# Patient Record
Sex: FEMALE | Employment: FULL TIME | URBAN - METROPOLITAN AREA
[De-identification: names, ages, dates, MRNs, and addresses within clinical notes are randomized per-mention and may not be internally consistent; named-entity substitution may affect disease eponyms.]

---
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
## 2021-02-20 ENCOUNTER — HOSPITAL ENCOUNTER (EMERGENCY)
Age: 21
Discharge: HOME | End: 2021-02-20
Attending: EMERGENCY MEDICINE
Payer: COMMERCIAL

## 2021-02-20 VITALS
RESPIRATION RATE: 14 BRPM | SYSTOLIC BLOOD PRESSURE: 155 MMHG | DIASTOLIC BLOOD PRESSURE: 71 MMHG | TEMPERATURE: 98 F | OXYGEN SATURATION: 98 % | HEART RATE: 104 BPM

## 2021-02-20 DIAGNOSIS — N39.0 URINARY TRACT INFECTION IN FEMALE: ICD-10-CM

## 2021-02-20 DIAGNOSIS — R10.32 ABDOMINAL PAIN, LEFT LOWER QUADRANT: Primary | ICD-10-CM

## 2021-02-20 LAB
-: ABNORMAL
ABSOLUTE EOS #: 0.1 K/UL (ref 0–0.4)
ABSOLUTE IMMATURE GRANULOCYTE: NORMAL K/UL (ref 0–0.3)
ABSOLUTE LYMPH #: 2.1 K/UL (ref 1.2–5.2)
ABSOLUTE MONO #: 0.5 K/UL (ref 0.1–1.3)
ALBUMIN SERPL-MCNC: 4.5 G/DL (ref 3.5–5.2)
ALBUMIN/GLOBULIN RATIO: ABNORMAL (ref 1–2.5)
ALP BLD-CCNC: 84 U/L (ref 35–104)
ALT SERPL-CCNC: 15 U/L (ref 5–33)
AMORPHOUS: ABNORMAL
ANION GAP SERPL CALCULATED.3IONS-SCNC: 7 MMOL/L (ref 9–17)
AST SERPL-CCNC: 29 U/L
BACTERIA: ABNORMAL
BASOPHILS # BLD: 1 % (ref 0–2)
BASOPHILS ABSOLUTE: 0.1 K/UL (ref 0–0.2)
BILIRUB SERPL-MCNC: 1.51 MG/DL (ref 0.3–1.2)
BILIRUBIN URINE: ABNORMAL
BUN BLDV-MCNC: 12 MG/DL (ref 6–20)
BUN/CREAT BLD: ABNORMAL (ref 9–20)
CALCIUM SERPL-MCNC: 9.3 MG/DL (ref 8.6–10.4)
CASTS UA: ABNORMAL /LPF
CHLORIDE BLD-SCNC: 102 MMOL/L (ref 98–107)
CO2: 25 MMOL/L (ref 20–31)
COLOR: YELLOW
COMMENT UA: ABNORMAL
CREAT SERPL-MCNC: 0.46 MG/DL (ref 0.5–0.9)
CRYSTALS, UA: ABNORMAL /HPF
DIFFERENTIAL TYPE: NORMAL
EOSINOPHILS RELATIVE PERCENT: 2 % (ref 0–4)
EPITHELIAL CELLS UA: ABNORMAL /HPF
GFR AFRICAN AMERICAN: ABNORMAL ML/MIN
GFR NON-AFRICAN AMERICAN: ABNORMAL ML/MIN
GFR SERPL CREATININE-BSD FRML MDRD: ABNORMAL ML/MIN/{1.73_M2}
GFR SERPL CREATININE-BSD FRML MDRD: ABNORMAL ML/MIN/{1.73_M2}
GLUCOSE BLD-MCNC: 100 MG/DL (ref 70–99)
GLUCOSE URINE: NEGATIVE
HCG(URINE) PREGNANCY TEST: NEGATIVE
HCT VFR BLD CALC: 44.9 % (ref 36–46)
HEMOGLOBIN: 15.3 G/DL (ref 12–16)
IMMATURE GRANULOCYTES: NORMAL %
KETONES, URINE: ABNORMAL
LEUKOCYTE ESTERASE, URINE: ABNORMAL
LYMPHOCYTES # BLD: 32 % (ref 25–45)
MCH RBC QN AUTO: 31 PG (ref 26–34)
MCHC RBC AUTO-ENTMCNC: 34.1 G/DL (ref 31–37)
MCV RBC AUTO: 91 FL (ref 80–100)
MONOCYTES # BLD: 7 % (ref 2–8)
MUCUS: ABNORMAL
NITRITE, URINE: NEGATIVE
NRBC AUTOMATED: NORMAL PER 100 WBC
OTHER OBSERVATIONS UA: ABNORMAL
PDW BLD-RTO: 13 % (ref 11.5–14.9)
PH UA: 5.5 (ref 5–8)
PLATELET # BLD: 232 K/UL (ref 150–450)
PLATELET ESTIMATE: NORMAL
PMV BLD AUTO: 9.2 FL (ref 6–12)
POTASSIUM SERPL-SCNC: 4.9 MMOL/L (ref 3.7–5.3)
PROTEIN UA: NEGATIVE
RBC # BLD: 4.93 M/UL (ref 4–5.2)
RBC # BLD: NORMAL 10*6/UL
RBC UA: ABNORMAL /HPF
RENAL EPITHELIAL, UA: ABNORMAL /HPF
SEG NEUTROPHILS: 58 % (ref 34–64)
SEGMENTED NEUTROPHILS ABSOLUTE COUNT: 3.9 K/UL (ref 1.3–9.1)
SODIUM BLD-SCNC: 134 MMOL/L (ref 135–144)
SPECIFIC GRAVITY UA: 1.03 (ref 1–1.03)
TOTAL PROTEIN: 7.6 G/DL (ref 6.4–8.3)
TRICHOMONAS: ABNORMAL
TURBIDITY: ABNORMAL
URINE HGB: NEGATIVE
UROBILINOGEN, URINE: NORMAL
WBC # BLD: 6.7 K/UL (ref 4.5–13.5)
WBC # BLD: NORMAL 10*3/UL
WBC UA: ABNORMAL /HPF
YEAST: ABNORMAL

## 2021-02-20 PROCEDURE — 81001 URINALYSIS AUTO W/SCOPE: CPT

## 2021-02-20 PROCEDURE — 96374 THER/PROPH/DIAG INJ IV PUSH: CPT

## 2021-02-20 PROCEDURE — 36415 COLL VENOUS BLD VENIPUNCTURE: CPT

## 2021-02-20 PROCEDURE — 85025 COMPLETE CBC W/AUTO DIFF WBC: CPT

## 2021-02-20 PROCEDURE — 80053 COMPREHEN METABOLIC PANEL: CPT

## 2021-02-20 PROCEDURE — 99284 EMERGENCY DEPT VISIT MOD MDM: CPT

## 2021-02-20 PROCEDURE — 87086 URINE CULTURE/COLONY COUNT: CPT

## 2021-02-20 PROCEDURE — 81025 URINE PREGNANCY TEST: CPT

## 2021-02-20 PROCEDURE — 6360000002 HC RX W HCPCS: Performed by: EMERGENCY MEDICINE

## 2021-02-20 RX ORDER — CEPHALEXIN 500 MG/1
500 CAPSULE ORAL 4 TIMES DAILY
Qty: 28 CAPSULE | Refills: 0 | Status: SHIPPED | OUTPATIENT
Start: 2021-02-20 | End: 2021-02-27

## 2021-02-20 RX ORDER — KETOROLAC TROMETHAMINE 30 MG/ML
30 INJECTION, SOLUTION INTRAMUSCULAR; INTRAVENOUS ONCE
Status: DISCONTINUED | OUTPATIENT
Start: 2021-02-20 | End: 2021-02-20 | Stop reason: HOSPADM

## 2021-02-20 ASSESSMENT — ENCOUNTER SYMPTOMS
BLOOD IN STOOL: 0
NAUSEA: 0
DIARRHEA: 0
BACK PAIN: 0
SHORTNESS OF BREATH: 0
ABDOMINAL PAIN: 1
COLOR CHANGE: 0
VOMITING: 0
SORE THROAT: 0
CONSTIPATION: 0
COUGH: 0
TROUBLE SWALLOWING: 0

## 2021-02-20 ASSESSMENT — PAIN SCALES - GENERAL: PAINLEVEL_OUTOF10: 7

## 2021-02-20 NOTE — ED PROVIDER NOTES
[address] [facility]  EMERGENCY DEPARTMENT ENCOUNTER    Pt Name: Amish Ayala  MRN: [id number]  YOB: 2000  Date of evaluation:2/20/21  PCP: Davian West [address] [place], [place] [address]       Chief Complaint   Patient presents with    Abdominal Pain     Lower abdominal pain; ongoing for 3 months; previous visits to her OB/GYN without a satifactory reason as to why the pain persists per pt.  Letter for School/Work       HISTORY OF PRESENT ILLNESS    Amish Ayala is a 24 y.o. female who presents with a chief complaint of abdominal pain. Patient states she has had left lower quadrant abdominal pain for the past 3 months. Rates it as 7 out of 10 severity. Sharp and nonradiating. Nothing makes it better or worse. No dysuria, hematuria, vaginal bleeding or discharge. No nausea or vomiting. No fevers or chills. States she has not seen her PCP for this as well as OB/GYN. States that she thought she was going to get an ultrasound as an outpatient however nobody ever scheduled it according to her. Symptoms are chronic. Symptoms are moderate. Nothing make symptoms better or worse. Patient has no other complaints at this time. REVIEW OF SYSTEMS       Review of Systems   Constitutional: Negative for chills, fatigue and fever. HENT: Negative for congestion, ear pain, sore throat and trouble swallowing. Eyes: Negative for visual disturbance. Respiratory: Negative for cough and shortness of breath. Cardiovascular: Negative for chest pain, palpitations and leg swelling. Gastrointestinal: Positive for abdominal pain. Negative for blood in stool, constipation, diarrhea, nausea and vomiting. Genitourinary: Negative for dysuria, flank pain, vaginal bleeding and vaginal discharge. Musculoskeletal: Negative for arthralgias, back pain, myalgias and neck pain. Skin: Negative for color change, rash and wound.    Neurological: Negative for dizziness, weakness, light-headedness, numbness and headaches. Psychiatric/Behavioral: Negative for confusion. All other systems reviewed and are negative. Negative in 10 essential Systems except as mentioned above and in the HPI. PAST MEDICAL HISTORY   History reviewed. No pertinent past medical history. None  SURGICAL HISTORY      has no past surgical history on file. None    CURRENT MEDICATIONS       Discharge Medication List as of 2/20/2021  4:46 PM      CONTINUE these medications which have NOT CHANGED    Details   tranexamic acid (LYSTEDA) 650 MG TABS tablet Take 1 tablet by mouth 5 times daily, Disp-25 tablet,R-11Normal             ALLERGIES     has No Known Allergies. FAMILY HISTORY     She indicated that her mother is alive. She indicated that her father is alive. She indicated that her sister is alive. She indicated that her brother is alive. She indicated that the status of her paternal grandmother is unknown. She indicated that the status of her paternal uncle is unknown.     family history includes Cancer in her paternal uncle; Diabetes in her father and paternal grandmother; High Blood Pressure in her father; Other in her father. SOCIAL HISTORY      reports that she has never smoked. She has never used smokeless tobacco. She reports that she does not drink alcohol or use drugs. PHYSICAL EXAM     INITIAL VITALS:  temperature is 98 °F (36.7 °C). Her blood pressure is 155/71 (abnormal) and her pulse is 104 (abnormal). Her respiration is 14 and oxygen saturation is 98%. Physical Exam  Vitals signs and nursing note reviewed. Constitutional:       General: She is not in acute distress. HENT:      Head: Normocephalic and atraumatic. Eyes:      Conjunctiva/sclera: Conjunctivae normal.      Pupils: Pupils are equal, round, and reactive to light. Neck:      Musculoskeletal: Neck supple. Cardiovascular:      Rate and Rhythm: Normal rate and regular rhythm. Heart sounds: Normal heart sounds. No murmur.    Pulmonary: Effort: Pulmonary effort is normal. No respiratory distress. Breath sounds: Normal breath sounds. Abdominal:      General: Bowel sounds are normal. There is no distension. Palpations: Abdomen is soft. Tenderness: There is abdominal tenderness in the left lower quadrant. Musculoskeletal:         General: No tenderness. Lymphadenopathy:      Cervical: No cervical adenopathy. Skin:     General: Skin is warm and dry. Findings: No rash. Neurological:      Mental Status: She is alert and oriented to person, place, and time. Psychiatric:         Judgment: Judgment normal.           DIFFERENTIAL DIAGNOSIS/MDM:   67-year-old female presents with 3 months of abdominal pain mostly on the left side. She is afebrile, nontoxic, normal vital signs. No acute distress. Differential includes unlikely ovarian torsion, ovarian cyst, UTI, unlikely appendicitis, STI. We will get blood work, urinalysis. I am going to get an ultrasound. I doubt ovarian torsion with how long her symptoms have been going on. More likely I think this is probably a functional or hemorrhagic ovarian cyst.    DIAGNOSTIC RESULTS     EKG: All EKG's are interpreted by the Emergency Department Physician who either signs or Co-signs this chart in the absence of a cardiologist.        RADIOLOGY:   I directly visualized the following  images and reviewed the radiologist interpretations:  [facility]    (Results Pending)           ED BEDSIDE ULTRASOUND:      LABS:  Labs Reviewed   URINE RT REFLEX TO CULTURE - Abnormal; Notable for the following components:       Result Value    Turbidity UA CLOUDY (*)     Bilirubin Urine   (*)     Value: Presumptive positive. Unable to confirm due to unavailability of reagent.     Ketones, Urine TRACE (*)     Leukocyte Esterase, Urine SMALL (*)     All other components within normal limits   COMPREHENSIVE METABOLIC PANEL - Abnormal; Notable for the following components:    Glucose 100 (*)     CREATININE 0.46 (*)     Sodium 134 (*)     Anion Gap 7 (*)     Total Bilirubin 1.51 (*)     All other components within normal limits   MICROSCOPIC URINALYSIS - Abnormal; Notable for the following components:    Bacteria, UA MODERATE (*)     All other components within normal limits   CULTURE, URINE   PREGNANCY, URINE   CBC WITH AUTO DIFFERENTIAL         EMERGENCY DEPARTMENT COURSE:   Vitals:    Vitals:    02/20/21 1532   BP: (!) 155/71   Pulse: (!) 104   Resp: 14   Temp: 98 °F (36.7 °C)   SpO2: 98%     5:33 PM EST  Updated by patient that she just got called into work and she wants to leave. She has not received her ultrasound. Blood work unremarkable urinalysis did show urinary tract infection. I am going to treat this however unclear if this is the cause of her symptoms over the past 3 months. I advised her to follow-up with her PCP, OB/GYN about getting an ultrasound. Again I have a very low suspicion for ovarian torsion due to the chronicity of her symptoms. Advised to return here if any symptoms worsen. She is agreeable to plan will be discharged home today. CRITICALCARE:      CONSULTS:  None      PROCEDURES:      FINAL IMPRESSION      1. Abdominal pain, left lower quadrant    2. Urinary tract infection in female            DISPOSITION/PLAN   DISPOSITION Decision To Discharge 02/20/2021 04:46:00 PM        PATIENT REFERRED TO:  LYNNETTE Macias - [facility]  [address] [address]   [address] [place] [address]  [phone]    Schedule an appointment as soon as possible for a visit       [facility] ED  [facility] [address] [phone]    As needed, If symptoms worsen      DISCHARGE MEDICATIONS:  Discharge Medication List as of 2/20/2021  4:46 PM      START taking these medications    Details   cephALEXin (KEFLEX) 500 MG capsule Take 1 capsule by mouth 4 times daily for 7 days, Disp-28 capsule, R-0Print             (Please note that portions ofthis note were

## 2021-02-20 NOTE — ED NOTES
Pt ambulated to bathroom without assistance or difficulty; gait even and steady. Provided pt with specimen cup. Pt voided and specimen sent to lab. Pt denies any further needs at this time. Pt is eupneic, A&OX4, and PWD. Call light in reach.           Josef Bazzi RN  02/20/21 9630

## 2021-02-21 LAB
CULTURE: NO GROWTH
Lab: NORMAL
SPECIMEN DESCRIPTION: NORMAL

## 2021-02-22 ENCOUNTER — OFFICE VISIT (OUTPATIENT)
Dept: PRIMARY CARE CLINIC | Age: 21
End: 2021-02-22
Payer: COMMERCIAL

## 2021-02-22 VITALS
TEMPERATURE: 97.2 F | DIASTOLIC BLOOD PRESSURE: 76 MMHG | SYSTOLIC BLOOD PRESSURE: 122 MMHG | BODY MASS INDEX: 19.24 KG/M2 | WEIGHT: 115.6 LBS | HEART RATE: 82 BPM

## 2021-02-22 DIAGNOSIS — G89.29 CHRONIC LLQ PAIN: Primary | ICD-10-CM

## 2021-02-22 DIAGNOSIS — R10.32 CHRONIC LLQ PAIN: Primary | ICD-10-CM

## 2021-02-22 PROCEDURE — G8420 CALC BMI NORM PARAMETERS: HCPCS | Performed by: NURSE PRACTITIONER

## 2021-02-22 PROCEDURE — G8484 FLU IMMUNIZE NO ADMIN: HCPCS | Performed by: NURSE PRACTITIONER

## 2021-02-22 PROCEDURE — 1036F TOBACCO NON-USER: CPT | Performed by: NURSE PRACTITIONER

## 2021-02-22 PROCEDURE — 99213 OFFICE O/P EST LOW 20 MIN: CPT | Performed by: NURSE PRACTITIONER

## 2021-02-22 PROCEDURE — G8427 DOCREV CUR MEDS BY ELIG CLIN: HCPCS | Performed by: NURSE PRACTITIONER

## 2021-02-22 ASSESSMENT — ENCOUNTER SYMPTOMS
FLATUS: 0
DIARRHEA: 0
NAUSEA: 0
ABDOMINAL PAIN: 1
BACK PAIN: 0
COUGH: 0
SHORTNESS OF BREATH: 0
CONSTIPATION: 0

## 2021-02-22 ASSESSMENT — PATIENT HEALTH QUESTIONNAIRE - PHQ9
SUM OF ALL RESPONSES TO PHQ QUESTIONS 1-9: 0
SUM OF ALL RESPONSES TO PHQ QUESTIONS 1-9: 0
2. FEELING DOWN, DEPRESSED OR HOPELESS: 0

## 2021-02-22 NOTE — PROGRESS NOTES
[address] [address]  Dept: [phone]    Lucrecia Wright is a 24 y.o. female Established patient, who presents today for her medical conditions/complaintsas noted below. Chief Complaint   Patient presents with    Abdominal Pain     x3 months- Pt states she went to her OBGYN and was gave medicine that did not help        HPI:     Abdominal Pain  This is a recurrent problem. The current episode started more than 1 month ago (Started more than a few months ago). The problem occurs constantly (It was coming and going but now hurts all the time). The pain is located in the LUQ. The pain is at a severity of 8/10 (does get as high as 10/10). The pain is severe. The quality of the pain is aching and sharp. The abdominal pain does not radiate. Pertinent negatives include no anorexia, constipation, diarrhea, dysuria, fever, flatus, headaches, hematuria, melena, nausea or weight loss. The pain is aggravated by NSAIDs (lifting). She has tried acetaminophen (NSAIDS) for the symptoms. Reviewed prior notes None  Reviewed previous Labs and Hospital Records - 2/20/21 [facility]      No results found for: Deniz Molina    (goal LDL is <100)   AST (U/L)   Date Value   02/20/2021 29     ALT (U/L)   Date Value   02/20/2021 15     BUN (mg/dL)   Date Value   02/20/2021 12     BP Readings from Last 3 Encounters:   02/22/21 122/76   02/20/21 (!) 155/71   12/08/20 122/82          (goal 120/80)    No past medical history on file. No past surgical history on file.     Family History   Problem Relation Age of Onset    Diabetes Father     High Blood Pressure Father     Other Father     Cancer Paternal Uncle     Diabetes Paternal Grandmother        Social History     Tobacco Use    Smoking status: Never Smoker    Smokeless tobacco: Never Used   Substance Use Topics    Alcohol use: Never     Frequency: Never      Current Outpatient Medications   Medication Sig Dispense Refill    cephALEXin (KEFLEX) 500 MG capsule Take 1 capsule by mouth 4 times daily for 7 days 28 capsule 0    tranexamic acid (LYSTEDA) 650 MG TABS tablet Take 1 tablet by mouth 5 times daily (Patient not taking: Reported on 2/22/2021) 25 tablet 11     No current facility-administered medications for this visit. No Known Allergies    Health Maintenance   Topic Date Due    Hepatitis C screen  2000    HIV screen  11/24/2016    Chlamydia screen  11/24/2017    Flu vaccine (1) 09/01/2020    DTaP/Tdap/Td vaccine (6 - Td) 07/30/2024    Hepatitis A vaccine  Completed    Hepatitis B vaccine  Completed    HPV vaccine  Completed    Varicella vaccine  Completed    Meningococcal (ACWY) vaccine  Completed    Hib vaccine  Aged Out    Pneumococcal 0-64 years Vaccine  Aged Out       Subjective:      Review of Systems   Constitutional: Negative for chills, fatigue, fever and weight loss. Respiratory: Negative for cough and shortness of breath. Cardiovascular: Negative for chest pain and leg swelling. Gastrointestinal: Positive for abdominal pain. Negative for anorexia, constipation, diarrhea, flatus, melena and nausea. Genitourinary: Negative for dysuria and hematuria. Musculoskeletal: Negative for back pain and gait problem. Skin: Negative for rash and wound. Neurological: Negative for light-headedness and headaches. Psychiatric/Behavioral: Negative for sleep disturbance. The patient is not nervous/anxious. Objective:     /76   Pulse 82   Temp 97.2 °F (36.2 °C)   Wt 115 lb 9.6 oz (52.4 kg)   BMI 19.24 kg/m²   Physical Exam  Vitals signs and nursing note reviewed. Constitutional:       General: She is not in acute distress. Appearance: She is well-developed. She is not ill-appearing. HENT:      Head: Normocephalic and atraumatic.       Right Ear: External ear normal.      Left Ear: External ear normal.   Eyes: General: No scleral icterus. Right eye: No discharge. Left eye: No discharge. Conjunctiva/sclera: Conjunctivae normal.      Pupils: Pupils are equal, round, and reactive to light. Neck:      Thyroid: No thyromegaly. Trachea: No tracheal deviation. Cardiovascular:      Rate and Rhythm: Normal rate and regular rhythm. Heart sounds: Normal heart sounds. Pulmonary:      Effort: Pulmonary effort is normal. No respiratory distress. Breath sounds: Normal breath sounds. No wheezing. Abdominal:      General: Bowel sounds are normal.      Palpations: Abdomen is soft. Tenderness: There is abdominal tenderness in the left lower quadrant. Comments: Significant tenderness left lower quadrant    Lymphadenopathy:      Cervical: No cervical adenopathy. Skin:     General: Skin is warm. Findings: No rash. Neurological:      Mental Status: She is alert and oriented to person, place, and time. Psychiatric:         Mood and Affect: Mood normal.         Behavior: Behavior normal.         Thought Content: Thought content normal.         Assessment:       Diagnosis Orders   1. Chronic LLQ pain  US NON OB TRANSVAGINAL        Plan:     Ultrasound  Acetaminophen as needed for pain  If ultrasound shows ovarian cyst then follow up with OB  If ultrasound is negative or inconclusive will order CT of abdomen and pelvis. Return if symptoms worsen or fail to improve. Orders Placed This Encounter   Procedures    US NON OB TRANSVAGINAL     This procedure can be scheduled via [facility]. Access your [facility] account by visiting [email or web address]. Standing Status:   Future     Standing Expiration Date:   2/22/2022     Order Specific Question:   Reason for exam:     Answer:   pain     No orders of the defined types were placed in this encounter. Patient given educationalmaterials - see patient instructions. Discussed use, benefit, and side effectsof prescribed medications. All patient questions answered. Pt voiced understanding. Reviewed health maintenance. Instructed to continue current medications, diet andexercise. Patient agreed with treatment plan. Follow up as directed.      Electronicallysigned by LYNNETTE Estes CNP on 2/22/2021 at 1:23 PM

## 2021-02-22 NOTE — LETTER
[facility] Primary Care  [address] [place] [address]  Phone: [phone]  Fax: [phone] [facility] Jojo Salgado CNP        February 22, 2021     Patient: Shalini Mora   YOB: 2000   Date of Visit: 2/22/2021       To Whom it May Concern:    Zoraida Chen was seen in my clinic on 2/22/2021. She may return to work on 2/23/2021. If you have any questions or concerns, please don't hesitate to call.     Sincerely,         LYNNETTE Jackson CNP

## 2021-02-24 ENCOUNTER — HOSPITAL ENCOUNTER (OUTPATIENT)
Dept: ULTRASOUND IMAGING | Age: 21
Discharge: HOME | End: 2021-02-26
Payer: COMMERCIAL

## 2021-02-24 DIAGNOSIS — R10.32 LLQ PAIN: ICD-10-CM

## 2021-02-24 DIAGNOSIS — G89.29 CHRONIC LLQ PAIN: ICD-10-CM

## 2021-02-24 DIAGNOSIS — R10.32 CHRONIC LLQ PAIN: ICD-10-CM

## 2021-02-24 PROCEDURE — 76856 US EXAM PELVIC COMPLETE: CPT

## 2021-02-24 PROCEDURE — 93976 VASCULAR STUDY: CPT

## 2021-02-25 NOTE — RESULT ENCOUNTER NOTE
Patient called back and was notified of the message below- Verbalized Understanding, she is asking what is best to get rid of all the constipation. Please advise.

## 2021-03-25 ENCOUNTER — OFFICE VISIT (OUTPATIENT)
Dept: PRIMARY CARE CLINIC | Age: 21
End: 2021-03-25
Payer: COMMERCIAL

## 2021-03-25 VITALS
WEIGHT: 112.2 LBS | SYSTOLIC BLOOD PRESSURE: 102 MMHG | DIASTOLIC BLOOD PRESSURE: 64 MMHG | BODY MASS INDEX: 18.67 KG/M2 | OXYGEN SATURATION: 99 % | HEART RATE: 88 BPM | TEMPERATURE: 97.5 F

## 2021-03-25 DIAGNOSIS — N61.1 BREAST ABSCESS: Primary | ICD-10-CM

## 2021-03-25 PROCEDURE — 1036F TOBACCO NON-USER: CPT | Performed by: PHYSICIAN ASSISTANT

## 2021-03-25 PROCEDURE — G8427 DOCREV CUR MEDS BY ELIG CLIN: HCPCS | Performed by: PHYSICIAN ASSISTANT

## 2021-03-25 PROCEDURE — G8484 FLU IMMUNIZE NO ADMIN: HCPCS | Performed by: PHYSICIAN ASSISTANT

## 2021-03-25 PROCEDURE — G8420 CALC BMI NORM PARAMETERS: HCPCS | Performed by: PHYSICIAN ASSISTANT

## 2021-03-25 PROCEDURE — 99213 OFFICE O/P EST LOW 20 MIN: CPT | Performed by: PHYSICIAN ASSISTANT

## 2021-03-25 RX ORDER — SULFAMETHOXAZOLE AND TRIMETHOPRIM 800; 160 MG/1; MG/1
1 TABLET ORAL 2 TIMES DAILY
Qty: 20 TABLET | Refills: 0 | Status: SHIPPED | OUTPATIENT
Start: 2021-03-25 | End: 2021-04-04

## 2021-03-25 RX ORDER — CEPHALEXIN 500 MG/1
500 CAPSULE ORAL 4 TIMES DAILY
Qty: 40 CAPSULE | Refills: 0 | Status: SHIPPED | OUTPATIENT
Start: 2021-03-25 | End: 2021-04-04

## 2021-03-25 ASSESSMENT — PATIENT HEALTH QUESTIONNAIRE - PHQ9
SUM OF ALL RESPONSES TO PHQ QUESTIONS 1-9: 0

## 2021-03-25 NOTE — PATIENT INSTRUCTIONS
"  SUBJECTIVE:                                                    Dulce Rome is a 89 Stephanie Calero, a trained medical scribe. the abscess to allow it to continue draining. Follow your doctor's instructions on bathing and caring for the wound. · If you are breastfeeding, continue breastfeeding or pumping breast milk, as your doctor advises. It is important to empty your breasts regularly. However, your doctor may advise you to discard the milk from the affected breast until the abscess heals. ? Before breastfeeding, place a warm, wet washcloth over the breast for about 15 minutes. Try this at least 3 times a day. ? If pus is no longer draining from the abscess, breastfeed on both sides. ? Gently massage your breast to stimulate milk flow. ? Pump or hand-express a small amount of breast milk before breastfeeding if your breasts are too full with milk or if it hurts too much to nurse. This will make your breasts less full and may make it easier for your baby to nurse. ? Try feeding from the healthy breast. Then, after your milk is flowing, breastfeed from the affected breast until it feels soft. · Be safe with medicines. Take pain medicines exactly as directed. ? If your doctor gave you a prescription medicine for pain, take it as prescribed. ? If you are not taking a prescription pain medicine, ask your doctor if you can take an over-the-counter medicine. ? Do not take two or more pain medicines at the same time unless your doctor told you to. Many pain medicines have acetaminophen, which is Tylenol. Too much acetaminophen (Tylenol) can be harmful. · Put ice or a cold pack on your breast for 10 to 15 minutes at a time to reduce pain and swelling. If you are breastfeeding, do this between feedings. Put a thin cloth between the ice and your skin. · If pus is draining from your infected breast, wash the nipple gently and let it air-dry before you put your bra back on. A disposable breast pad placed in the bra cup will help absorb the pus. When should you call for help? Call 911 anytime you think you may need emergency care.  For example, call if:    · You have trouble breathing.     · You passed out (lost consciousness). Call your doctor now or seek immediate medical care if:    · You have new or worse nausea or vomiting.     · Your symptoms of infection get worse. This may include:  ? Increased pain, swelling, redness, or warmth around a breast.  ? Red streaks extending from the breast.  ? Pus draining from a breast.  ? A new or higher fever. Watch closely for changes in your health, and be sure to contact your doctor if:    · You do not get better as expected. Where can you learn more? Go to https://[email or web address].Retidoc. org and sign in to your enVista account. Enter W391 in the Run3D box to learn more about \"Breast Abscess: Care Instructions. \"     If you do not have an account, please click on the \"Sign Up Now\" link. Current as of: July 17, 2020               Content Version: 12.8  © 2006-2021 [employer] Care instructions adapted under license by [employer]. If you have questions about a medical condition or this instruction, always ask your healthcare professional. [place] [address] any warranty or liability for your use of this information. Peter Cee MD  [facility]

## 2021-03-25 NOTE — PROGRESS NOTES
[address] [facility] PRIMARY [facility]ARE  [address] [address]  Dept: [phone]    Tiffany Campos is a 24 y.o. female Established patient, who presents today for her medical conditions/complaints as noted below. Chief Complaint   Patient presents with    Cyst     on chest - left side - started weeks ago - pt has been squeezing it - states puss comes out. HPI:     HPI   Pt has never had boils or abscesses in past, but her Father has had a couple abscesses. Pt said her abscess started on her chest about 2 weeks ago. Says she thought it was a pimple and tried to pop it, but it got larger 2 days ago. She also tried [facility]. No fever. Reviewed prior notes None  Reviewed previous none    No results found for: LDLCHOLESTEROL, LDLCALC    (goal LDL is <100)   AST (U/L)   Date Value   02/20/2021 29     ALT (U/L)   Date Value   02/20/2021 15     BUN (mg/dL)   Date Value   02/20/2021 12     BP Readings from Last 3 Encounters:   03/25/21 102/64   02/22/21 122/76   02/20/21 (!) 155/71          (goal 120/80)    No past medical history on file. No past surgical history on file.     Family History   Problem Relation Age of Onset    Diabetes Father     High Blood Pressure Father     Other Father     Cancer Paternal Uncle     Diabetes Paternal Grandmother        Social History     Tobacco Use    Smoking status: Never Smoker    Smokeless tobacco: Never Used   Substance Use Topics    Alcohol use: Never     Frequency: Never      Current Outpatient Medications   Medication Sig Dispense Refill    sulfamethoxazole-trimethoprim (BACTRIM DS) 800-160 MG per tablet Take 1 tablet by mouth 2 times daily for 10 days 20 tablet 0    cephALEXin (KEFLEX) 500 MG capsule Take 1 capsule by mouth 4 times daily for 10 days 40 capsule 0    magnesium citrate solution Take 296 mLs by mouth once for 1 dose 296 mL 0     No current facility-administered medications for this visit. No Known Allergies    Health Maintenance   Topic Date Due    Hepatitis C screen  Never done    HIV screen  Never done    COVID-19 Vaccine (1) Never done    Chlamydia screen  Never done    Flu vaccine (1) 09/01/2020    DTaP/Tdap/Td vaccine (6 - Td) 07/30/2024    Hepatitis A vaccine  Completed    Hepatitis B vaccine  Completed    HPV vaccine  Completed    Varicella vaccine  Completed    Meningococcal (ACWY) vaccine  Completed    Hib vaccine  Aged Out    Pneumococcal 0-64 years Vaccine  Aged Out       Subjective:      Review of Systems   Constitutional: Negative for fever. Skin:        +Painful mass on left chest       Objective:     /64   Pulse 88   Temp 97.5 °F (36.4 °C)   Wt 112 lb 3.2 oz (50.9 kg)   SpO2 99%   BMI 18.67 kg/m²   Physical Exam  Vitals signs and nursing note reviewed. Constitutional:       Appearance: Normal appearance. HENT:      Head: Normocephalic and atraumatic. Cardiovascular:      Rate and Rhythm: Normal rate and regular rhythm. Pulmonary:      Effort: Pulmonary effort is normal.      Breath sounds: Normal breath sounds. Chest:       Neurological:      Mental Status: She is alert. Assessment:       Diagnosis Orders   1. Breast abscess  sulfamethoxazole-trimethoprim (BACTRIM DS) 800-160 MG per tablet    cephALEXin (KEFLEX) 500 MG capsule        Plan:    Warm compresses 4x per day with Keflex/Bactrim. Can continue boil-ease. Since abscess is on breast tissue, I would refer to general surgeon if it is not improving. Return if symptoms worsen or fail to improve. No orders of the defined types were placed in this encounter.     Orders Placed This Encounter   Medications    sulfamethoxazole-trimethoprim (BACTRIM DS) 800-160 MG per tablet     Sig: Take 1 tablet by mouth 2 times daily for 10 days     Dispense:  20 tablet     Refill:  0    cephALEXin (KEFLEX) 500 MG capsule     Sig: Take 1 capsule by mouth 4 times daily for 10 days Dispense:  40 capsule     Refill:  0       Patient given educational materials - see patient instructions. Discussed use, benefit, and side effects of prescribed medications. All patient questions answered. Pt voiced understanding. Reviewed health maintenance. Instructed to continue current medications, diet and exercise. Patient agreed with treatment plan. Follow up as directed.      Electronically signed by Carlos Renteria PA-C on 3/31/2021 at 10:09 AM

## 2021-03-29 ENCOUNTER — TELEPHONE (OUTPATIENT)
Dept: PRIMARY CARE CLINIC | Age: 21
End: 2021-03-29

## 2021-03-29 NOTE — TELEPHONE ENCOUNTER
Pt saw you about a month ago. Still having issues with constipation. OTC stuff not helping. Tried x-lax, dolcolax. Did not try a suppository. Asking if you want her to get colon cleanser? Please advise. Uses [facility] on [address].

## 2021-04-06 ENCOUNTER — TELEPHONE (OUTPATIENT)
Dept: PRIMARY CARE CLINIC | Age: 21
End: 2021-04-06

## 2021-04-06 DIAGNOSIS — N94.6 DYSMENORRHEA: Primary | ICD-10-CM

## 2021-04-06 RX ORDER — NORGESTIMATE AND ETHINYL ESTRADIOL 7DAYSX3 28
1 KIT ORAL DAILY
Qty: 28 TABLET | Refills: 3 | Status: SHIPPED | OUTPATIENT
Start: 2021-04-06 | End: 2021-10-14 | Stop reason: SDUPTHER

## 2021-04-06 NOTE — TELEPHONE ENCOUNTER
Tri-Sprintec sent to pharmacy. She should start taking the the 1st Sunday after the onset of menses and take as direct on package.

## 2021-04-06 NOTE — TELEPHONE ENCOUNTER
Pt states that you offered  to put her on Birthcontrol, but she declined. Changed her mind. Wants to try and get her cycles regulated. Asking if you would send in rx to [facility]?

## 2021-04-09 ENCOUNTER — OFFICE VISIT (OUTPATIENT)
Dept: PRIMARY CARE CLINIC | Age: 21
End: 2021-04-09
Payer: COMMERCIAL

## 2021-04-09 ENCOUNTER — HOSPITAL ENCOUNTER (OUTPATIENT)
Age: 21
Setting detail: SPECIMEN
Discharge: HOME | End: 2021-04-09
Payer: COMMERCIAL

## 2021-04-09 VITALS
OXYGEN SATURATION: 98 % | HEART RATE: 119 BPM | TEMPERATURE: 97.5 F | SYSTOLIC BLOOD PRESSURE: 100 MMHG | DIASTOLIC BLOOD PRESSURE: 58 MMHG

## 2021-04-09 DIAGNOSIS — J34.89 STUFFY AND RUNNY NOSE: Primary | ICD-10-CM

## 2021-04-09 DIAGNOSIS — H61.23 BILATERAL IMPACTED CERUMEN: ICD-10-CM

## 2021-04-09 PROCEDURE — G8420 CALC BMI NORM PARAMETERS: HCPCS | Performed by: PHYSICIAN ASSISTANT

## 2021-04-09 PROCEDURE — 99213 OFFICE O/P EST LOW 20 MIN: CPT | Performed by: PHYSICIAN ASSISTANT

## 2021-04-09 PROCEDURE — 1036F TOBACCO NON-USER: CPT | Performed by: PHYSICIAN ASSISTANT

## 2021-04-09 PROCEDURE — G8427 DOCREV CUR MEDS BY ELIG CLIN: HCPCS | Performed by: PHYSICIAN ASSISTANT

## 2021-04-09 ASSESSMENT — ENCOUNTER SYMPTOMS
VOMITING: 0
DIARRHEA: 0
COUGH: 1
NAUSEA: 0
SORE THROAT: 1
SHORTNESS OF BREATH: 0
RHINORRHEA: 1

## 2021-04-09 NOTE — PROGRESS NOTES
[address] [place] [address]  Dept: [phone]    Conrad Fernandes is a 24 y.o. female Established patient, who presents today for her medical conditions/complaints as noted below. Chief Complaint   Patient presents with    Nasal Congestion     no sore throat or cough and or headache        HPI:     HPIThe patient was having a headache sore throat and stuffy nose. She normally gets a cold around this time. She would like a test. Works at [employer]. Symptoms are gone today pt would like tested. Reviewed prior notes None  Reviewed previous Labs    No results found for: LDLCHOLESTEROL, LDLCALC    (goal LDL is <100)   AST (U/L)   Date Value   02/20/2021 29     ALT (U/L)   Date Value   02/20/2021 15     BUN (mg/dL)   Date Value   02/20/2021 12     BP Readings from Last 3 Encounters:   04/09/21 (!) 100/58   03/25/21 102/64   02/22/21 122/76          (goal 120/80)    History reviewed. No pertinent past medical history. History reviewed. No pertinent surgical history. Family History   Problem Relation Age of Onset    Diabetes Father     High Blood Pressure Father     Other Father     Cancer Paternal Uncle     Diabetes Paternal Grandmother        Social History     Tobacco Use    Smoking status: Never Smoker    Smokeless tobacco: Never Used   Substance Use Topics    Alcohol use: Never     Frequency: Never      Current Outpatient Medications   Medication Sig Dispense Refill    carbamide peroxide (DEBROX) 6.5 % otic solution Place 5 drops into both ears 2 times daily 15 mL 2    Norgestim-Eth Estrad Triphasic (TRI-SPRINTEC) 0.18/0.215/0.25 MG-35 MCG TABS Take 1 tablet by mouth daily 28 tablet 3    magnesium citrate solution Take 296 mLs by mouth once for 1 dose 296 mL 0     No current facility-administered medications for this visit.       No Known Allergies    Health Maintenance   Topic Date Due    Hepatitis C screen  Never done    HIV screen  Never done    COVID-19 Vaccine (1) Never done    Chlamydia screen  Never done    Flu vaccine (Season Ended) 09/01/2021    DTaP/Tdap/Td vaccine (6 - Td) 07/30/2024    Hepatitis A vaccine  Completed    Hepatitis B vaccine  Completed    HPV vaccine  Completed    Varicella vaccine  Completed    Meningococcal (ACWY) vaccine  Completed    Hib vaccine  Aged Out    Pneumococcal 0-64 years Vaccine  Aged Out       Subjective:      Review of Systems   Constitutional: Negative for chills and fever. HENT: Positive for rhinorrhea and sore throat. Negative for congestion. Respiratory: Positive for cough. Negative for shortness of breath. Gastrointestinal: Negative for diarrhea, nausea and vomiting. Skin: Negative for rash. Neurological: Positive for headaches. Objective:     BP (!) 100/58   Pulse (!) 119   Temp 97.5 °F (36.4 °C)   SpO2 98%   Physical Exam  Constitutional:       General: She is not in acute distress. Appearance: Normal appearance. She is not ill-appearing. HENT:      Head: Normocephalic and atraumatic. Right Ear: External ear normal. There is impacted cerumen. Left Ear: External ear normal. There is impacted cerumen. Nose: Nose normal.      Mouth/Throat:      Mouth: Mucous membranes are moist.   Eyes:      Extraocular Movements: Extraocular movements intact. Conjunctiva/sclera: Conjunctivae normal.      Pupils: Pupils are equal, round, and reactive to light. Neck:      Musculoskeletal: Normal range of motion and neck supple. Vascular: No carotid bruit. Cardiovascular:      Rate and Rhythm: Normal rate and regular rhythm. Pulses: Normal pulses. Heart sounds: Normal heart sounds. Pulmonary:      Effort: Pulmonary effort is normal. No respiratory distress. Breath sounds: Normal breath sounds. Abdominal:      General: Bowel sounds are normal. There is no distension. Tenderness:  There is no abdominal tenderness. Musculoskeletal: Normal range of motion. Lymphadenopathy:      Cervical: No cervical adenopathy. Skin:     General: Skin is warm and dry. Neurological:      General: No focal deficit present. Mental Status: She is alert and oriented to person, place, and time. Psychiatric:         Mood and Affect: Mood normal.         Behavior: Behavior normal.         Thought Content: Thought content normal.         Assessment and Plan:          1. Stuffy and runny nose  -     COVID-19; Future  2. Bilateral impacted cerumen  -     carbamide peroxide (DEBROX) 6.5 % otic solution; Place 5 drops into both ears 2 times daily, Disp-15 mL, R-2Normal             Patient given educational materials - see patient instructions. Discussed use, benefit, and side effects of prescribed medications. All patient questions answered. Pt voiced understanding. Reviewed health maintenance. Instructed to continue current medications, diet and exercise. Patient agreed with treatment plan. Follow up as directed.      Electronically signed by LUANA Tsang on 4/9/2021 at 3:43 PM

## 2021-04-10 DIAGNOSIS — J34.89 STUFFY AND RUNNY NOSE: ICD-10-CM

## 2021-04-11 LAB
SARS-COV-2: NORMAL
SARS-COV-2: NOT DETECTED
SOURCE: NORMAL

## 2021-04-12 ENCOUNTER — TELEPHONE (OUTPATIENT)
Dept: PRIMARY CARE CLINIC | Age: 21
End: 2021-04-12

## 2021-05-07 ENCOUNTER — OFFICE VISIT (OUTPATIENT)
Dept: PRIMARY CARE CLINIC | Age: 21
End: 2021-05-07
Payer: COMMERCIAL

## 2021-05-07 VITALS
HEIGHT: 65 IN | DIASTOLIC BLOOD PRESSURE: 80 MMHG | WEIGHT: 114.4 LBS | SYSTOLIC BLOOD PRESSURE: 116 MMHG | OXYGEN SATURATION: 98 % | BODY MASS INDEX: 19.06 KG/M2 | HEART RATE: 128 BPM

## 2021-05-07 DIAGNOSIS — L05.01 PILONIDAL CYST WITH ABSCESS: Primary | ICD-10-CM

## 2021-05-07 PROCEDURE — 1036F TOBACCO NON-USER: CPT | Performed by: PHYSICIAN ASSISTANT

## 2021-05-07 PROCEDURE — G8420 CALC BMI NORM PARAMETERS: HCPCS | Performed by: PHYSICIAN ASSISTANT

## 2021-05-07 PROCEDURE — G8427 DOCREV CUR MEDS BY ELIG CLIN: HCPCS | Performed by: PHYSICIAN ASSISTANT

## 2021-05-07 PROCEDURE — 99213 OFFICE O/P EST LOW 20 MIN: CPT | Performed by: PHYSICIAN ASSISTANT

## 2021-05-07 RX ORDER — CEPHALEXIN 500 MG/1
500 CAPSULE ORAL 3 TIMES DAILY
Qty: 30 CAPSULE | Refills: 0 | Status: SHIPPED | OUTPATIENT
Start: 2021-05-07 | End: 2021-06-15 | Stop reason: ALTCHOICE

## 2021-05-07 ASSESSMENT — ENCOUNTER SYMPTOMS
ABDOMINAL PAIN: 0
PHOTOPHOBIA: 0
RHINORRHEA: 0
SHORTNESS OF BREATH: 0
VOMITING: 0
DIARRHEA: 0
ABDOMINAL DISTENTION: 0
SORE THROAT: 0
CHEST TIGHTNESS: 0
SINUS PRESSURE: 0
COUGH: 0
CONSTIPATION: 0
EYE DISCHARGE: 0

## 2021-05-07 NOTE — PROGRESS NOTES
[address] [address]  Dept: [phone]    Zhao Yoon is a 24 y.o. female Established patient, who presents today for her medical conditions/complaints as noted below. Chief Complaint   Patient presents with    Other     bump on lower back        HPI:     HPI: The patient is having a lump on her back. No fevers it is slightly painful and it hurts. It drained a lot yesterday. Pt has had this once before about 2-3 days ago. Reviewed prior notes Previous PCP  Reviewed previous Hospital Records    No results found for: LDLCHOLESTEROL, LDLCALC    (goal LDL is <100)   AST (U/L)   Date Value   02/20/2021 29     ALT (U/L)   Date Value   02/20/2021 15     BUN (mg/dL)   Date Value   02/20/2021 12     BP Readings from Last 3 Encounters:   05/07/21 116/80   04/09/21 (!) 100/58   03/25/21 102/64          (goal 120/80)    No past medical history on file. No past surgical history on file. Family History   Problem Relation Age of Onset    Diabetes Father     High Blood Pressure Father     Other Father     Cancer Paternal Uncle     Diabetes Paternal Grandmother        Social History     Tobacco Use    Smoking status: Never Smoker    Smokeless tobacco: Never Used   Substance Use Topics    Alcohol use: Never     Frequency: Never      Current Outpatient Medications   Medication Sig Dispense Refill    cephALEXin (KEFLEX) 500 MG capsule Take 1 capsule by mouth 3 times daily 30 capsule 0    carbamide peroxide (DEBROX) 6.5 % otic solution Place 5 drops into both ears 2 times daily 15 mL 2    Norgestim-Eth Estrad Triphasic (TRI-SPRINTEC) 0.18/0.215/0.25 MG-35 MCG TABS Take 1 tablet by mouth daily 28 tablet 3     No current facility-administered medications for this visit.       No Known Allergies    Health Maintenance   Topic Date Due    Hepatitis C screen  Never done    HIV screen  Never done    Chlamydia screen  Never done    COVID-19 Vaccine (2 - Pfizer 2-dose series) 05/16/2021    Flu vaccine (Season Ended) 09/01/2021    DTaP/Tdap/Td vaccine (6 - Td) 07/30/2024    Hepatitis A vaccine  Completed    Hepatitis B vaccine  Completed    HPV vaccine  Completed    Varicella vaccine  Completed    Meningococcal (ACWY) vaccine  Completed    Hib vaccine  Aged Out    Pneumococcal 0-64 years Vaccine  Aged Out       Subjective:      Review of Systems   Constitutional: Negative for chills, fever and unexpected weight change. HENT: Negative for congestion, hearing loss, rhinorrhea, sinus pressure and sore throat. Eyes: Negative for photophobia, discharge and visual disturbance. Respiratory: Negative for cough, chest tightness and shortness of breath. Cardiovascular: Negative for chest pain, palpitations and leg swelling. Gastrointestinal: Negative for abdominal distention, abdominal pain, constipation, diarrhea and vomiting. Endocrine: Negative for polydipsia and polyuria. Genitourinary: Negative for decreased urine volume, difficulty urinating, frequency and urgency. Musculoskeletal: Negative for arthralgias, gait problem and myalgias. Skin: Positive for wound. Negative for rash. Allergic/Immunologic: Negative for food allergies. Neurological: Negative for dizziness, weakness, numbness and headaches. Hematological: Negative for adenopathy. Psychiatric/Behavioral: Negative for dysphoric mood and sleep disturbance. The patient is not nervous/anxious. Objective:     /80   Pulse (!) 128   Ht 5' 5\" (1.651 m)   Wt 114 lb 6.4 oz (51.9 kg)   SpO2 98%   BMI 19.04 kg/m²   Physical Exam  Constitutional:       General: She is not in acute distress. Appearance: Normal appearance. She is not ill-appearing. HENT:      Head: Normocephalic and atraumatic.       Right Ear: External ear normal.      Left Ear: External ear normal.      Nose: Nose normal.      Mouth/Throat:      Mouth: Mucous membranes

## 2021-06-02 DIAGNOSIS — N63.0 BREAST MASS: Primary | ICD-10-CM

## 2021-06-07 ENCOUNTER — HOSPITAL ENCOUNTER (OUTPATIENT)
Dept: WOMENS IMAGING | Age: 21
Discharge: HOME | End: 2021-06-09
Payer: COMMERCIAL

## 2021-06-07 DIAGNOSIS — N63.0 BREAST MASS: ICD-10-CM

## 2021-06-07 PROCEDURE — 76642 ULTRASOUND BREAST LIMITED: CPT

## 2021-06-15 DIAGNOSIS — L05.01 PILONIDAL CYST WITH ABSCESS: Primary | ICD-10-CM

## 2021-06-15 RX ORDER — CEPHALEXIN 500 MG/1
500 CAPSULE ORAL 2 TIMES DAILY
Qty: 14 CAPSULE | Refills: 0 | Status: SHIPPED | OUTPATIENT
Start: 2021-06-15 | End: 2021-06-22

## 2021-06-21 ENCOUNTER — OFFICE VISIT (OUTPATIENT)
Dept: SURGERY | Age: 21
End: 2021-06-21
Payer: COMMERCIAL

## 2021-06-21 VITALS
HEART RATE: 103 BPM | HEIGHT: 60 IN | WEIGHT: 113.4 LBS | BODY MASS INDEX: 22.26 KG/M2 | DIASTOLIC BLOOD PRESSURE: 79 MMHG | SYSTOLIC BLOOD PRESSURE: 125 MMHG | OXYGEN SATURATION: 99 %

## 2021-06-21 DIAGNOSIS — L05.91 PILONIDAL CYST: Primary | ICD-10-CM

## 2021-06-21 PROCEDURE — 1036F TOBACCO NON-USER: CPT | Performed by: SURGERY

## 2021-06-21 PROCEDURE — G8427 DOCREV CUR MEDS BY ELIG CLIN: HCPCS | Performed by: SURGERY

## 2021-06-21 PROCEDURE — G8420 CALC BMI NORM PARAMETERS: HCPCS | Performed by: SURGERY

## 2021-06-21 PROCEDURE — 99243 OFF/OP CNSLTJ NEW/EST LOW 30: CPT | Performed by: SURGERY

## 2021-06-21 NOTE — PROGRESS NOTES
[address] [address] [facility] Surgery   History & Physical  Rochelle Peter DO  Pt Name: Lacey Segura  MRN: [id number]  YOB: 2000  Date of evaluation: 6/21/2021  Primary Care Physician: LYNNETTE Quintana CNP    Chief Complaint: pilonidal cyst      SUBJECTIVE:    History of Present Illness: This is a 24 y.o.  female who presents for evaluation for pilonidal cyst with an adjacent skin eruption, pt reports this has been waxing/waning in terms of size and discomfort, she has had two episodes of spontaneous rupture with drainage and minimal bleeding. Nonsmoker, no history of diabetes. Chart review performed to add information to the HPI: Yes    Past Medical History   has no past medical history on file. Past Surgical History   has no past surgical history on file. Family History  family history includes Cancer in her paternal uncle; Diabetes in her father and paternal grandmother; High Blood Pressure in her father; Other in her father. Social History  Tobacco use:  reports that she has never smoked. She has never used smokeless tobacco.  Alcohol use:  reports no history of alcohol use. Drug use:  reports no history of drug use. Medications  Current Medications:   Current Outpatient Medications   Medication Sig Dispense Refill    cephALEXin (KEFLEX) 500 MG capsule Take 1 capsule by mouth 2 times daily for 7 days (Patient not taking: Reported on 6/21/2021) 14 capsule 0    Norgestim-Eth Estrad Triphasic (TRI-SPRINTEC) 0.18/0.215/0.25 MG-35 MCG TABS Take 1 tablet by mouth daily (Patient not taking: Reported on 6/21/2021) 28 tablet 3     No current facility-administered medications for this visit. Home Medications:   Prior to Admission medications    Medication Sig Start Date End Date Taking?  Authorizing Provider   cephALEXin (KEFLEX) 500 MG capsule Take 1 capsule by mouth 2 times daily for 7 days  Patient not taking: Reported on 6/21/2021 6/15/21 6/22/21  LUANA Steen

## 2021-06-29 ENCOUNTER — PATIENT MESSAGE (OUTPATIENT)
Dept: PRIMARY CARE CLINIC | Age: 21
End: 2021-06-29

## 2021-06-30 ENCOUNTER — NURSE TRIAGE (OUTPATIENT)
Dept: OTHER | Facility: CLINIC | Age: 21
End: 2021-06-30

## 2021-06-30 ENCOUNTER — OFFICE VISIT (OUTPATIENT)
Dept: PRIMARY CARE CLINIC | Age: 21
End: 2021-06-30
Payer: COMMERCIAL

## 2021-06-30 VITALS
DIASTOLIC BLOOD PRESSURE: 78 MMHG | SYSTOLIC BLOOD PRESSURE: 118 MMHG | BODY MASS INDEX: 21.95 KG/M2 | HEART RATE: 96 BPM | OXYGEN SATURATION: 98 % | WEIGHT: 112.4 LBS

## 2021-06-30 DIAGNOSIS — N61.1 BREAST ABSCESS: ICD-10-CM

## 2021-06-30 DIAGNOSIS — R42 LIGHTHEADEDNESS: Primary | ICD-10-CM

## 2021-06-30 DIAGNOSIS — N92.0 MENORRHAGIA WITH REGULAR CYCLE: ICD-10-CM

## 2021-06-30 DIAGNOSIS — I95.1 ORTHOSTATIC HYPOTENSION: ICD-10-CM

## 2021-06-30 PROCEDURE — G8420 CALC BMI NORM PARAMETERS: HCPCS | Performed by: PHYSICIAN ASSISTANT

## 2021-06-30 PROCEDURE — 99213 OFFICE O/P EST LOW 20 MIN: CPT | Performed by: PHYSICIAN ASSISTANT

## 2021-06-30 PROCEDURE — G8427 DOCREV CUR MEDS BY ELIG CLIN: HCPCS | Performed by: PHYSICIAN ASSISTANT

## 2021-06-30 PROCEDURE — 1036F TOBACCO NON-USER: CPT | Performed by: PHYSICIAN ASSISTANT

## 2021-06-30 RX ORDER — DOXYCYCLINE HYCLATE 100 MG/1
100 CAPSULE ORAL 2 TIMES DAILY
Qty: 20 CAPSULE | Refills: 0 | Status: SHIPPED | OUTPATIENT
Start: 2021-06-30 | End: 2021-07-10

## 2021-06-30 SDOH — ECONOMIC STABILITY: FOOD INSECURITY: WITHIN THE PAST 12 MONTHS, THE FOOD YOU BOUGHT JUST DIDN'T LAST AND YOU DIDN'T HAVE MONEY TO GET MORE.: NEVER TRUE

## 2021-06-30 SDOH — ECONOMIC STABILITY: FOOD INSECURITY: WITHIN THE PAST 12 MONTHS, YOU WORRIED THAT YOUR FOOD WOULD RUN OUT BEFORE YOU GOT MONEY TO BUY MORE.: NEVER TRUE

## 2021-06-30 ASSESSMENT — SOCIAL DETERMINANTS OF HEALTH (SDOH): HOW HARD IS IT FOR YOU TO PAY FOR THE VERY BASICS LIKE FOOD, HOUSING, MEDICAL CARE, AND HEATING?: NOT HARD AT ALL

## 2021-06-30 NOTE — TELEPHONE ENCOUNTER
This was addressed at pt's appointment today- will try one more round of antibiotics and if not improving, advised she would have to see a surgeon if she wanted it removed.

## 2021-06-30 NOTE — TELEPHONE ENCOUNTER
Reason for Disposition   Patient wants to be seen    Answer Assessment - Initial Assessment Questions  1. DESCRIPTION: \"Describe your dizziness. \"      Pt was at work and got hot, took a drink and it got worse and she felt like she was going to pass out but caught herself. She felt light headed and dizzy. 2. LIGHTHEADED: \"Do you feel lightheaded? \" (e.g., somewhat faint, woozy, weak upon standing)          Woozy, weak in the knees, felt like she was going to faint     3. VERTIGO: \"Do you feel like either you or the room is spinning or tilting? \" (i.e. vertigo)           Denies     4. SEVERITY: \"How bad is it? \"  \"Do you feel like you are going to faint? \" \"Can you stand and walk? \"    - MILD - walking normally    - MODERATE - interferes with normal activities (e.g., work, school)     - SEVERE - unable to stand, requires support to walk, feels like passing out now. Mild-  Pt just feels really hot right now, able to walk okay     5. ONSET:  \"When did the dizziness begin? \"         Today around 8am     6. AGGRAVATING FACTORS: \"Does anything make it worse? \" (e.g., standing, change in head position)          Denies     7. HEART RATE: \"Can you tell me your heart rate? \" \"How many beats in 15 seconds? \"  (Note: not all patients can do this)             Pt went to nurses office at work and she was told it was fast but unsure of number           Pt counted on wrist with writer timing and it was 80BPM     8. CAUSE: \"What do you think is causing the dizziness? \"            Unsure     9. RECURRENT SYMPTOM: \"Have you had dizziness before? \" If so, ask: \"When was the last time? \" \"What happened that time? \"            When pt was 13yo, she was with her dad at doctors office, had eaten and when they were leaving she suddenly got light headed and passed out. Nothing since then.  She did go to doctors office but is unsure of what the end result was, but he did recommend checking for iron deficiency but unsure if she did or not. 10. OTHER SYMPTOMS: \"Do you have any other symptoms? \" (e.g., fever, chest pain, vomiting, diarrhea, bleeding)             Denies all of the above     11. PREGNANCY: \"Is there any chance you are pregnant? \" \"When was your last menstrual period? \"             Denies    Protocols used: [id number]    Received call from Esperanza Mason   at [facility] with [employer]. Brief description of triage: see above     Triage indicates for patient to be seen today for dizziness     Care advice provided, patient verbalizes understanding; denies any other questions or concerns; instructed to call back for any new or worsening symptoms. Writer provided warm transfer to [facility] at [facility] for appointment scheduling. Attention Provider: Thank you for allowing me to participate in the care of your patient. The patient was connected to triage in response to information provided to the E[facility]. Please do not respond through this encounter as the response is not directed to a shared pool.

## 2021-06-30 NOTE — PROGRESS NOTES
History     Tobacco Use    Smoking status: Never Smoker    Smokeless tobacco: Never Used   Substance Use Topics    Alcohol use: Never      Current Outpatient Medications   Medication Sig Dispense Refill    doxycycline hyclate (VIBRAMYCIN) 100 MG capsule Take 1 capsule by mouth 2 times daily for 10 days Take with food, but avoid dairy, calcium and MTV's 2 hours before and after the dose 20 capsule 0    Norgestim-Eth Estrad Triphasic (TRI-SPRINTEC) 0.18/0.215/0.25 MG-35 MCG TABS Take 1 tablet by mouth daily (Patient not taking: Reported on 6/30/2021) 28 tablet 3     No current facility-administered medications for this visit. No Known Allergies    Health Maintenance   Topic Date Due    Hepatitis C screen  Never done    HIV screen  Never done    Chlamydia screen  Never done    Flu vaccine (1) 09/01/2021    DTaP/Tdap/Td vaccine (6 - Td or Tdap) 07/30/2024    Hepatitis A vaccine  Completed    Hepatitis B vaccine  Completed    HPV vaccine  Completed    Varicella vaccine  Completed    Meningococcal (ACWY) vaccine  Completed    COVID-19 Vaccine  Completed    Hib vaccine  Aged Out    Pneumococcal 0-64 years Vaccine  Aged Out       Subjective:      Review of Systems   Eyes: Negative for visual disturbance. Respiratory: Negative for shortness of breath. Cardiovascular: Negative for chest pain. Skin:        +Lump on left breast   Neurological: Positive for light-headedness. Negative for dizziness, syncope, numbness and headaches. Objective:     /78 (Position: Standing)   Pulse 96   Wt 112 lb 6.4 oz (51 kg)   LMP 06/19/2021 (Exact Date)   SpO2 98%   BMI 21.95 kg/m²   Physical Exam  Vitals and nursing note reviewed. Constitutional:       Appearance: Normal appearance. HENT:      Head: Normocephalic and atraumatic. Eyes:      Extraocular Movements: Extraocular movements intact. Pupils: Pupils are equal, round, and reactive to light. Neck:      Vascular: No carotid bruit. Cardiovascular:      Rate and Rhythm: Normal rate and regular rhythm. Pulmonary:      Effort: Pulmonary effort is normal.      Breath sounds: Normal breath sounds. Chest:       Neurological:      Mental Status: She is alert. Cranial Nerves: No cranial nerve deficit. Assessment/Plan:   1. Lightheadedness  -     Iron And TIBC; Future  -     CBC Auto Differential; Future  -     Basic Metabolic Panel; Future  -     Magnesium; Future  -     Ferritin; Future  2. Orthostatic hypotension   -Pt's diastolic BP did drop by 10 with position change: advised pt to stand up slowly and hydrate well with water and electrolyte drinks   3. Menorrhagia with regular cycle  -     Iron And TIBC; Future  -     CBC Auto Differential; Future  -     Ferritin; Future  -Start OCP on the Sunday after her next period  4. Breast abscess  -     doxycycline hyclate (VIBRAMYCIN) 100 MG capsule; Take 1 capsule by mouth 2 times daily for 10 days Take with food, but avoid dairy, calcium and MTV's 2 hours before and after the dose, Disp-20 capsule, R-0Normal   -Advised pt she would have to sees a surgeon to remove the breast cyst if this 2nd round of antibiotics did not help. Return if symptoms worsen or fail to improve. Orders Placed This Encounter   Procedures    Iron And TIBC     Standing Status:   Future     Standing Expiration Date:   6/30/2022     Order Specific Question:   Is Patient Fasting? Answer:   no     Order Specific Question:   No of Hours?      Answer:   n/a    CBC Auto Differential     Standing Status:   Future     Standing Expiration Date:   7/1/2022    Basic Metabolic Panel     Standing Status:   Future     Standing Expiration Date:   6/30/2022    Magnesium     Standing Status:   Future     Standing Expiration Date:   6/30/2022    Ferritin     Standing Status:   Future     Standing Expiration Date:   6/30/2022     Orders Placed This Encounter   Medications    doxycycline hyclate (VIBRAMYCIN) 100 MG capsule     Sig: Take 1 capsule by mouth 2 times daily for 10 days Take with food, but avoid dairy, calcium and MTV's 2 hours before and after the dose     Dispense:  20 capsule     Refill:  0       Patient given educational materials - see patient instructions. Discussed use, benefit, and side effects of prescribed medications. All patient questions answered. Pt voiced understanding. Reviewed health maintenance. Instructed to continue current medications, diet and exercise. Patient agreed with treatment plan. Follow up as directed.      Electronically signed by Betty Perez PA-C on 7/6/2021 at 11:18 PM

## 2021-06-30 NOTE — PATIENT INSTRUCTIONS
Patient Education        Combination Birth Control Pills: Care Instructions  Your Care Instructions     Combination birth control pills are used to prevent pregnancy. They give you a regular dose of the hormones estrogen and progestin. You take a hormone pill every day to prevent pregnancy. Birth control pills come in packs. The most common type has 3 weeks of hormone pills. Some packs have sugar pills (they do not contain any hormones) for the fourth week. During that fourth no-hormone week, you have your period. After the fourth week (28 days), you start a new pack. Some birth control pills are packaged in different ways. For example, some have hormone pills for the fourth week instead of sugar pills. Taking hormones for the entire month causes you to not have periods or to have fewer periods. Others are packaged so that you have a period every 3 months. Your doctor will tell you what type of pills you have. Follow-up care is a key part of your treatment and safety. Be sure to make and go to all appointments, and call your doctor if you are having problems. It's also a good idea to know your test results and keep a list of the medicines you take. How can you care for yourself at home? How do you take the pill? · Follow your doctor's instructions about when to start taking your pills. Use backup birth control, such as a condom, or don't have intercourse for 7 days after you start your pills. · Take your pills every day, at about the same time of day. To help yourself do this, try to take them when you do something else every day, such as brushing your teeth. What if you forget to take a pill? Always read the label for specific instructions, or call your doctor. Here are some basic guidelines:  · If you miss 1 hormone pill, take it as soon as you remember. Ask your doctor if you may need to use a backup birth control method, such as a condom, or not have intercourse.   · If you miss 2 or more hormone pills, take one as soon as you remember you forgot them. Then read the pill label or call your doctor about instructions on how to take your missed pills. Use a backup method of birth control or don't have intercourse for 7 days. Pregnancy is more likely if you miss more than 1 pill. · If you had intercourse, you can use emergency contraception to help prevent pregnancy. The most effective emergency contraception is the copper IUD (inserted by a doctor). You can also get emergency contraceptive pills without a prescription at most drugstores. What else do you need to know? · The pill can have side effects. ? You may have very light or skipped periods. ? You may have bleeding between periods (spotting). This usually decreases after 3 to 4 months. ? You may have mood changes, less interest in sex, or weight gain. · The pill may reduce acne, heavy bleeding and cramping, and symptoms of premenstrual syndrome. · Check with your doctor before you use any other medicines, including over-the-counter medicines, vitamins, herbal products, and supplements. Birth control hormones may not work as well to prevent pregnancy when combined with other medicines. · The pill doesn't protect against sexually transmitted infection (STIs), such as herpes or HIV/AIDS. If you're not sure whether your sex partner might have an STI, use a condom to protect against disease. When should you call for help? Call your doctor now or seek immediate medical care if:    · You have severe belly pain.     · You have signs of a blood clot, such as:  ? Pain in your calf, back of the knee, thigh, or groin. ? Redness and swelling in your leg or groin.     · You have blurred vision or other problems seeing.     · You have a severe headache.     · You have severe trouble breathing.    Watch closely for changes in your health, and be sure to contact your doctor if:    · You think you might be pregnant.     · You think you may be depressed.     · You think you may have been exposed to or have a sexually transmitted infection. Where can you learn more? Go to [email or web address] and sign in to your [employer] account. Enter B200 in the [facility] box to learn more about \"Combination Birth Control Pills: Care Instructions. \"     If you do not have an account, please click on the \"Sign Up Now\" link. Current as of: October 8, 2020               Content Version: 12.9  © 2006-2021 [employer]. Care instructions adapted under license by [facility] ([facility]). If you have questions about a medical condition or this instruction, always ask your healthcare professional. [address] any warranty or liability for your use of this information.

## 2021-07-06 PROBLEM — N92.0 MENORRHAGIA WITH REGULAR CYCLE: Status: ACTIVE | Noted: 2021-07-06

## 2021-07-06 PROBLEM — I95.1 ORTHOSTATIC HYPOTENSION: Status: ACTIVE | Noted: 2021-07-06

## 2021-07-06 PROBLEM — N61.1 BREAST ABSCESS: Status: ACTIVE | Noted: 2021-07-06

## 2021-07-06 ASSESSMENT — ENCOUNTER SYMPTOMS: SHORTNESS OF BREATH: 0

## 2021-07-12 ENCOUNTER — OFFICE VISIT (OUTPATIENT)
Dept: PRIMARY CARE CLINIC | Age: 21
End: 2021-07-12
Payer: COMMERCIAL

## 2021-07-12 VITALS
HEART RATE: 92 BPM | BODY MASS INDEX: 22.62 KG/M2 | DIASTOLIC BLOOD PRESSURE: 70 MMHG | SYSTOLIC BLOOD PRESSURE: 108 MMHG | WEIGHT: 115.2 LBS | OXYGEN SATURATION: 95 % | HEIGHT: 60 IN

## 2021-07-12 DIAGNOSIS — R42 LIGHTHEADEDNESS: Primary | ICD-10-CM

## 2021-07-12 PROCEDURE — G8420 CALC BMI NORM PARAMETERS: HCPCS | Performed by: PHYSICIAN ASSISTANT

## 2021-07-12 PROCEDURE — 99213 OFFICE O/P EST LOW 20 MIN: CPT | Performed by: PHYSICIAN ASSISTANT

## 2021-07-12 PROCEDURE — 1036F TOBACCO NON-USER: CPT | Performed by: PHYSICIAN ASSISTANT

## 2021-07-12 PROCEDURE — 93000 ELECTROCARDIOGRAM COMPLETE: CPT | Performed by: PHYSICIAN ASSISTANT

## 2021-07-12 PROCEDURE — G8427 DOCREV CUR MEDS BY ELIG CLIN: HCPCS | Performed by: PHYSICIAN ASSISTANT

## 2021-07-12 NOTE — PROGRESS NOTES
-Get labs drawn that were previously ordered on 6/30/21. -Advised she wear compression socks for prolonged standing.   -If testing is normal and lightheadedness persists, would suggest trial of Florinef for orthostatic hypotension. Return if symptoms worsen or fail to improve. Orders Placed This Encounter   Procedures    EKG 12 Lead     Order Specific Question:   Reason for Exam?     Answer: Other     Comments:   lightheadedness    Echocardiogram complete     Standing Status:   Future     Standing Expiration Date:   9/10/2021     Order Specific Question:   Reason for exam:     Answer:   lightheadedness for 1.5 weeks     No orders of the defined types were placed in this encounter. Patient given educational materials - see patient instructions. Discussed use, benefit, and side effects of prescribed medications. All patient questions answered. Pt voiced understanding. Reviewed health maintenance. Instructed to continue current medications, diet and exercise. Patient agreed with treatment plan. Follow up as directed.      Electronically signed by Brittaney Clark PA-C on 7/17/2021 at 11:25 AM

## 2021-07-17 ASSESSMENT — ENCOUNTER SYMPTOMS
VOMITING: 0
SHORTNESS OF BREATH: 0
DIARRHEA: 0

## 2021-08-19 ENCOUNTER — OFFICE VISIT (OUTPATIENT)
Dept: PRIMARY CARE CLINIC | Age: 21
End: 2021-08-19
Payer: COMMERCIAL

## 2021-08-19 VITALS
OXYGEN SATURATION: 99 % | SYSTOLIC BLOOD PRESSURE: 112 MMHG | WEIGHT: 110.6 LBS | DIASTOLIC BLOOD PRESSURE: 60 MMHG | TEMPERATURE: 98.6 F | HEART RATE: 102 BPM | BODY MASS INDEX: 21.71 KG/M2 | HEIGHT: 60 IN

## 2021-08-19 DIAGNOSIS — H61.23 BILATERAL IMPACTED CERUMEN: Primary | ICD-10-CM

## 2021-08-19 PROCEDURE — 69209 REMOVE IMPACTED EAR WAX UNI: CPT | Performed by: PHYSICIAN ASSISTANT

## 2021-08-19 PROCEDURE — G8420 CALC BMI NORM PARAMETERS: HCPCS | Performed by: PHYSICIAN ASSISTANT

## 2021-08-19 PROCEDURE — 99212 OFFICE O/P EST SF 10 MIN: CPT | Performed by: PHYSICIAN ASSISTANT

## 2021-08-19 PROCEDURE — 1036F TOBACCO NON-USER: CPT | Performed by: PHYSICIAN ASSISTANT

## 2021-08-19 PROCEDURE — G8427 DOCREV CUR MEDS BY ELIG CLIN: HCPCS | Performed by: PHYSICIAN ASSISTANT

## 2021-08-19 NOTE — PROGRESS NOTES
left ear and I removed slight dead skin in the ear canal with the curette.   -NILE Menon could not remove the cerumen from the right ear. I was able to successfully irrigate it. Pt said her ears felt better afterward. Return if symptoms worsen or fail to improve. Orders Placed This Encounter   Procedures    HI REMOVAL IMPACTED CERUMEN IRRIGATION/LVG UNILAT     No orders of the defined types were placed in this encounter. Patient given educational materials - see patient instructions. Discussed use, benefit, and side effects of prescribed medications. All patient questions answered. Pt voiced understanding. Reviewed health maintenance. Instructed to continue current medications, diet and exercise. Patient agreed with treatment plan. Follow up as directed.      Electronically signed by Sesar Mcdaniel PA-C on 8/25/2021 at 10:44 PM

## 2021-08-25 ASSESSMENT — ENCOUNTER SYMPTOMS
SORE THROAT: 0
PHOTOPHOBIA: 0
VOMITING: 0
NAUSEA: 0

## 2021-08-30 ENCOUNTER — TELEPHONE (OUTPATIENT)
Dept: PRIMARY CARE CLINIC | Age: 21
End: 2021-08-30

## 2021-08-30 NOTE — TELEPHONE ENCOUNTER
LM for patient to c/b regarding her appt. She needs to reschedule her appt after she has her echocardiogram done and her lab.

## 2021-09-01 ENCOUNTER — HOSPITAL ENCOUNTER (OUTPATIENT)
Dept: NON INVASIVE DIAGNOSTICS | Age: 21
Discharge: HOME | End: 2021-09-01
Payer: COMMERCIAL

## 2021-09-01 ENCOUNTER — HOSPITAL ENCOUNTER (OUTPATIENT)
Age: 21
Discharge: HOME | End: 2021-09-01
Payer: COMMERCIAL

## 2021-09-01 ENCOUNTER — TELEPHONE (OUTPATIENT)
Dept: PRIMARY CARE CLINIC | Age: 21
End: 2021-09-01

## 2021-09-01 DIAGNOSIS — R42 LIGHTHEADEDNESS: ICD-10-CM

## 2021-09-01 DIAGNOSIS — N92.0 MENORRHAGIA WITH REGULAR CYCLE: ICD-10-CM

## 2021-09-01 LAB
ABSOLUTE EOS #: 0.1 K/UL (ref 0–0.4)
ABSOLUTE IMMATURE GRANULOCYTE: NORMAL K/UL (ref 0–0.3)
ABSOLUTE LYMPH #: 1.7 K/UL (ref 1.2–5.2)
ABSOLUTE MONO #: 0.4 K/UL (ref 0.1–1.3)
ANION GAP SERPL CALCULATED.3IONS-SCNC: 8 MMOL/L (ref 9–17)
BASOPHILS # BLD: 0 % (ref 0–2)
BASOPHILS ABSOLUTE: 0 K/UL (ref 0–0.2)
BUN BLDV-MCNC: 14 MG/DL (ref 6–20)
BUN/CREAT BLD: ABNORMAL (ref 9–20)
CALCIUM SERPL-MCNC: 9.3 MG/DL (ref 8.6–10.4)
CHLORIDE BLD-SCNC: 102 MMOL/L (ref 98–107)
CO2: 29 MMOL/L (ref 20–31)
CREAT SERPL-MCNC: 0.56 MG/DL (ref 0.5–0.9)
DIFFERENTIAL TYPE: NORMAL
EOSINOPHILS RELATIVE PERCENT: 2 % (ref 0–4)
FERRITIN: 27 UG/L (ref 13–150)
GFR AFRICAN AMERICAN: ABNORMAL ML/MIN
GFR NON-AFRICAN AMERICAN: ABNORMAL ML/MIN
GFR SERPL CREATININE-BSD FRML MDRD: ABNORMAL ML/MIN/{1.73_M2}
GFR SERPL CREATININE-BSD FRML MDRD: ABNORMAL ML/MIN/{1.73_M2}
GLUCOSE BLD-MCNC: 113 MG/DL (ref 70–99)
HCT VFR BLD CALC: 41.7 % (ref 36–46)
HEMOGLOBIN: 14.2 G/DL (ref 12–16)
IMMATURE GRANULOCYTES: NORMAL %
IRON SATURATION: 32 % (ref 20–55)
IRON: 114 UG/DL (ref 37–145)
LV EF: 63 %
LVEF MODALITY: NORMAL
LYMPHOCYTES # BLD: 30 % (ref 25–45)
MAGNESIUM: 2.1 MG/DL (ref 1.7–2.2)
MCH RBC QN AUTO: 31 PG (ref 26–34)
MCHC RBC AUTO-ENTMCNC: 34.1 G/DL (ref 31–37)
MCV RBC AUTO: 91 FL (ref 80–100)
MONOCYTES # BLD: 8 % (ref 2–8)
NRBC AUTOMATED: NORMAL PER 100 WBC
PDW BLD-RTO: 13 % (ref 11.5–14.9)
PLATELET # BLD: 266 K/UL (ref 150–450)
PLATELET ESTIMATE: NORMAL
PMV BLD AUTO: 8.6 FL (ref 6–12)
POTASSIUM SERPL-SCNC: 4.9 MMOL/L (ref 3.7–5.3)
RBC # BLD: 4.59 M/UL (ref 4–5.2)
RBC # BLD: NORMAL 10*6/UL
SEG NEUTROPHILS: 60 % (ref 34–64)
SEGMENTED NEUTROPHILS ABSOLUTE COUNT: 3.3 K/UL (ref 1.3–9.1)
SODIUM BLD-SCNC: 139 MMOL/L (ref 135–144)
TOTAL IRON BINDING CAPACITY: 359 UG/DL (ref 250–450)
UNSATURATED IRON BINDING CAPACITY: 245 UG/DL (ref 112–347)
WBC # BLD: 5.5 K/UL (ref 4.5–13.5)
WBC # BLD: NORMAL 10*3/UL

## 2021-09-01 PROCEDURE — 80048 BASIC METABOLIC PNL TOTAL CA: CPT

## 2021-09-01 PROCEDURE — 83550 IRON BINDING TEST: CPT

## 2021-09-01 PROCEDURE — 83540 ASSAY OF IRON: CPT

## 2021-09-01 PROCEDURE — 36415 COLL VENOUS BLD VENIPUNCTURE: CPT

## 2021-09-01 PROCEDURE — 85025 COMPLETE CBC W/AUTO DIFF WBC: CPT

## 2021-09-01 PROCEDURE — 93306 TTE W/DOPPLER COMPLETE: CPT

## 2021-09-01 PROCEDURE — 82728 ASSAY OF FERRITIN: CPT

## 2021-09-01 PROCEDURE — 83735 ASSAY OF MAGNESIUM: CPT

## 2021-09-01 NOTE — TELEPHONE ENCOUNTER
Pt asking for a work note for 8/30 and 8/31 and return to work tonight. Pt states she was experiencing dizziness and could not work.

## 2021-09-02 ENCOUNTER — OFFICE VISIT (OUTPATIENT)
Dept: PRIMARY CARE CLINIC | Age: 21
End: 2021-09-02
Payer: COMMERCIAL

## 2021-09-02 VITALS
OXYGEN SATURATION: 90 % | HEART RATE: 106 BPM | DIASTOLIC BLOOD PRESSURE: 78 MMHG | BODY MASS INDEX: 21.25 KG/M2 | SYSTOLIC BLOOD PRESSURE: 110 MMHG | WEIGHT: 108.8 LBS

## 2021-09-02 DIAGNOSIS — R42 LIGHTHEADEDNESS: ICD-10-CM

## 2021-09-02 DIAGNOSIS — I95.1 ORTHOSTATIC HYPOTENSION: Primary | ICD-10-CM

## 2021-09-02 PROCEDURE — 1036F TOBACCO NON-USER: CPT | Performed by: NURSE PRACTITIONER

## 2021-09-02 PROCEDURE — G8427 DOCREV CUR MEDS BY ELIG CLIN: HCPCS | Performed by: NURSE PRACTITIONER

## 2021-09-02 PROCEDURE — 99213 OFFICE O/P EST LOW 20 MIN: CPT | Performed by: NURSE PRACTITIONER

## 2021-09-02 PROCEDURE — G8420 CALC BMI NORM PARAMETERS: HCPCS | Performed by: NURSE PRACTITIONER

## 2021-09-02 RX ORDER — MIDODRINE HYDROCHLORIDE 2.5 MG/1
2.5 TABLET ORAL 3 TIMES DAILY
Qty: 90 TABLET | Refills: 1 | Status: SHIPPED | OUTPATIENT
Start: 2021-09-02 | End: 2022-01-03

## 2021-09-02 ASSESSMENT — ENCOUNTER SYMPTOMS
BACK PAIN: 0
SINUS PAIN: 0
CONSTIPATION: 0
SHORTNESS OF BREATH: 0
DIARRHEA: 0
NAUSEA: 0
COUGH: 0
SINUS PRESSURE: 0

## 2021-09-02 NOTE — PATIENT INSTRUCTIONS
Take midodrine one tablet 2x/day and if lightheadedness and dizziness do not improve or it does not last all day then take a 3x/day with the tablets spaced out. Drink plenty of water - keep hydrated. Compression stockings daily.

## 2021-09-02 NOTE — PROGRESS NOTES
[facility] CARE  [address] [place] [id number]  Dept: [phone]    Opal Dexter is a 24 y.o. female Established patient, who presents today for her medical conditions/complaints as noted below. Chief Complaint   Patient presents with    Dizziness     had lab work yesterday. HPI:     HPI  She has had dizziness since June. States it went away for a little while in August but it came back last week. She reports every time she stands up or moves fast she gets dizzy. She works at [employer] and it is fast paced. Labs and cardiac echo completed yesterday were All normal.  She drinks water all the time. Reviewed prior notes Previous PCP  Reviewed previous Labs and Imaging9/1/2021    No results found for: LDLCHOLESTEROL, LDLCALC    (goal LDL is <100)   AST (U/L)   Date Value   02/20/2021 29     ALT (U/L)   Date Value   02/20/2021 15     BUN (mg/dL)   Date Value   09/01/2021 14     BP Readings from Last 3 Encounters:   09/02/21 110/78   08/19/21 112/60   07/12/21 108/70          (goal 120/80)    No past medical history on file. No past surgical history on file. Family History   Problem Relation Age of Onset    Diabetes Father     High Blood Pressure Father     Other Father     Cancer Paternal Uncle     Diabetes Paternal Grandmother        Social History     Tobacco Use    Smoking status: Never Smoker    Smokeless tobacco: Never Used   Substance Use Topics    Alcohol use: Never      Current Outpatient Medications   Medication Sig Dispense Refill    midodrine (PROAMATINE) 2.5 MG tablet Take 1 tablet by mouth 3 times daily 90 tablet 1    Norgestim-Eth Estrad Triphasic (TRI-SPRINTEC) 0.18/0.215/0.25 MG-35 MCG TABS Take 1 tablet by mouth daily 28 tablet 3     No current facility-administered medications for this visit.      No Known Allergies    Health Maintenance   Topic Date Due    Hepatitis C screen  Never done    HIV screen Never done    Chlamydia screen  Never done    Flu vaccine (1) 09/01/2021    DTaP/Tdap/Td vaccine (6 - Td or Tdap) 07/30/2024    Hepatitis A vaccine  Completed    Hepatitis B vaccine  Completed    HPV vaccine  Completed    Varicella vaccine  Completed    Meningococcal (ACWY) vaccine  Completed    COVID-19 Vaccine  Completed    Hib vaccine  Aged Out    Pneumococcal 0-64 years Vaccine  Aged Out       Subjective:      Review of Systems   Constitutional: Negative for chills, fatigue and fever. HENT: Negative for congestion, ear pain, sinus pressure and sinus pain. Respiratory: Negative for cough and shortness of breath. Cardiovascular: Negative for chest pain, palpitations and leg swelling. Gastrointestinal: Negative for constipation, diarrhea and nausea. Genitourinary: Negative for difficulty urinating and dysuria. Musculoskeletal: Negative for arthralgias and back pain. Skin: Negative for rash and wound. Neurological: Positive for dizziness. Negative for headaches. Psychiatric/Behavioral: Negative for sleep disturbance. The patient is not nervous/anxious. Objective:     /78   Pulse (!) 106   Wt 108 lb 12.8 oz (49.4 kg)   SpO2 90%   BMI 21.25 kg/m²   Physical Exam  Vitals and nursing note reviewed. Constitutional:       General: She is not in acute distress. Appearance: She is well-developed. She is not ill-appearing. HENT:      Head: Normocephalic and atraumatic. Right Ear: External ear normal.      Left Ear: External ear normal.   Eyes:      Conjunctiva/sclera: Conjunctivae normal.      Pupils: Pupils are equal, round, and reactive to light. Neck:      Thyroid: No thyromegaly. Trachea: No tracheal deviation. Cardiovascular:      Rate and Rhythm: Normal rate and regular rhythm. Heart sounds: Normal heart sounds. Comments: No carotid bruit  Pulmonary:      Effort: Pulmonary effort is normal. No respiratory distress.       Breath sounds: Normal breath sounds. No wheezing. Abdominal:      General: Bowel sounds are normal.      Palpations: Abdomen is soft. Musculoskeletal:      Right lower leg: No edema. Left lower leg: No edema. Lymphadenopathy:      Cervical: No cervical adenopathy. Skin:     General: Skin is warm. Findings: No rash. Neurological:      Mental Status: She is alert and oriented to person, place, and time. Coordination: Romberg sign negative. Comments: Epley's test negative   Psychiatric:         Mood and Affect: Mood normal.         Behavior: Behavior normal.         Thought Content: Thought content normal.         Assessment/Plan:   1. Orthostatic hypotension  -     midodrine (PROAMATINE) 2.5 MG tablet; Take 1 tablet by mouth 3 times daily, Disp-90 tablet, R-1Normal  2. Lightheadedness  -     midodrine (PROAMATINE) 2.5 MG tablet; Take 1 tablet by mouth 3 times daily, Disp-90 tablet, R-1Normal  Take midodrine one tablet 2x/day and if lightheadedness and dizziness do not improve or it does not last all day then take a 3x/day with the tablets spaced out. Drink plenty of water - keep hydrated. Compression stockings daily. Return in about 2 months (around 11/2/2021) for orthostatic hypotension . No orders of the defined types were placed in this encounter. Orders Placed This Encounter   Medications    midodrine (PROAMATINE) 2.5 MG tablet     Sig: Take 1 tablet by mouth 3 times daily     Dispense:  90 tablet     Refill:  1       Patient given educational materials - see patient instructions. Discussed use, benefit, and side effects of prescribed medications. All patient questions answered. Pt voiced understanding. Reviewed health maintenance. Instructed to continue current medications, diet and exercise. Patient agreed with treatment plan. Follow up as directed.      Electronically signed by LYNNETTE Ponce CNP on 9/2/2021 at 2:26 PM

## 2021-10-08 ENCOUNTER — NURSE TRIAGE (OUTPATIENT)
Dept: OTHER | Facility: CLINIC | Age: 21
End: 2021-10-08

## 2021-10-08 NOTE — TELEPHONE ENCOUNTER
Received call from [facility] at [facility] with Red Flag Complaint. Brief description of triage: clot in menses. Triage indicates for patient to home care. Asked patient if she would like to schedule a follow-up appointment with PCP regarding getting back on birth control to regulate menses. Pt stated yes. Care advice provided, patient verbalizes understanding; denies any other questions or concerns; instructed to call back for any new or worsening symptoms. Writer provided warm transfer to [employer] at [facility] for appointment scheduling. Attention Provider: Thank you for allowing me to participate in the care of your patient. The patient was connected to triage in response to information provided to the ECC/PSC. Please do not respond through this encounter as the response is not directed to a shared pool. Reason for Disposition   [1] Menstrual cycle < 21 days OR > 35 days AND [2] has occurred once this past year    Answer Assessment - Initial Assessment Questions  Pt calling for clinical advice regarding her menses. 1. AMOUNT: \"Describe the bleeding that you are having. \"     - SPOTTING: spotting, or pinkish / brownish mucous discharge; does not fill panti-liner or pad     - MILD:  less than 1 pad / hour; less than patient's usual menstrual bleeding    - MODERATE: 1-2 pads / hour; 1 menstrual cup every 6 hours; small-medium blood clots (e.g., pea, grape, small coin)    - SEVERE: soaking 2 or more pads/hour for 2 or more hours; 1 menstrual cup every 2 hours; bleeding not contained by pads or continuous red blood from vagina; large blood clots (e.g., golf ball, large coin)       Not heavier than normal. Just passed 1 large clot    2. ONSET: \"When did the bleeding begin? \" \"Is it continuing now? \"      Began on expected menses time    3. MENSTRUAL PERIOD: \"When was the last normal menstrual period? \" \"How is this different than your period? \"      Last menses    4.  REGULARITY: \"How regular are your periods? \"      Irregular. States trying to get on birth control but not sure how to take them. States was prescribed birth control but lost it. 5. ABDOMINAL PAIN: \"Do you have any pain? \" \"How bad is the pain? \"  (e.g., Scale 1-10; mild, moderate, or severe)    - MILD (1-3): doesn't interfere with normal activities, abdomen soft and not tender to touch     - MODERATE (4-7): interferes with normal activities or awakens from sleep, tender to touch     - SEVERE (8-10): excruciating pain, doubled over, unable to do any normal activities       Denies    6. PREGNANCY: \"Could you be pregnant? \" Maxine Raymond you sexually active? \" \"Did you recently give birth? \"      Sexually active. LMP prior to this menses \"middle of September\" stating early.  has not had more than 1 early menses when asked. 7. BREASTFEEDING: \"Are you breastfeeding? \"      No    8. HORMONES: Maxine Raymond you taking any hormone medications, prescription or OTC? \" (e.g., birth control pills, estrogen)      Not on birth control    9. BLOOD THINNERS: \"Do you take any blood thinners? \" (e.g., Coumadin/warfarin, Pradaxa/dabigatran, aspirin)      No    10. CAUSE: \"What do you think is causing the bleeding? \" (e.g., recent gyn surgery, recent gyn procedure; known bleeding disorder, cervical cancer, polycystic ovarian disease, fibroids)          Denies    11. HEMODYNAMIC STATUS: \"Are you weak or feeling lightheaded? \" If so, ask: \"Can you stand and walk normally? \"         Denies    12. OTHER SYMPTOMS: \"What other symptoms are you having with the bleeding? \" (e.g., passed tissue, vaginal discharge, fever, menstrual-type cramps)        1 clot. Denies fever. Denies n/v.    Educated pt color changes in menses throughout the cycle.     Protocols used: VAGINAL BLEEDING - ABNORMAL-ADULT-

## 2021-10-14 ENCOUNTER — TELEPHONE (OUTPATIENT)
Dept: PRIMARY CARE CLINIC | Age: 21
End: 2021-10-14

## 2021-10-14 DIAGNOSIS — N94.6 DYSMENORRHEA: ICD-10-CM

## 2021-10-14 RX ORDER — NORGESTIMATE AND ETHINYL ESTRADIOL 7DAYSX3 28
1 KIT ORAL DAILY
Qty: 28 TABLET | Refills: 3 | Status: SHIPPED | OUTPATIENT
Start: 2021-10-14

## 2021-10-14 NOTE — TELEPHONE ENCOUNTER
----- Message from Ajluis Tyrels sent at 10/14/2021  3:58 PM EDT -----  Subject: Message to Provider    QUESTIONS  Information for Provider? pt wants to know if provider can prescribe her   another birth control pill. The first one she got prescribed got lost so   she wants a new one to replace it.   ---------------------------------------------------------------------------  --------------  CALL BACK INFO  What is the best way for the office to contact you? OK to leave message on   voicemail  Preferred Call Back Phone Number? [phone]  ---------------------------------------------------------------------------  --------------  SCRIPT ANSWERS  Relationship to Patient?  Self

## 2021-12-27 ENCOUNTER — OFFICE VISIT (OUTPATIENT)
Dept: PRIMARY CARE CLINIC | Age: 21
End: 2021-12-27
Payer: COMMERCIAL

## 2021-12-27 VITALS
SYSTOLIC BLOOD PRESSURE: 110 MMHG | WEIGHT: 116.8 LBS | HEIGHT: 64 IN | DIASTOLIC BLOOD PRESSURE: 60 MMHG | OXYGEN SATURATION: 98 % | HEART RATE: 112 BPM | BODY MASS INDEX: 19.94 KG/M2

## 2021-12-27 DIAGNOSIS — R10.9 ABDOMINAL PAIN, UNSPECIFIED ABDOMINAL LOCATION: Primary | ICD-10-CM

## 2021-12-27 DIAGNOSIS — R10.32 ACUTE LEFT LOWER QUADRANT PAIN: ICD-10-CM

## 2021-12-27 LAB
BILIRUBIN, POC: NEGATIVE
BLOOD URINE, POC: NEGATIVE
CLARITY, POC: NORMAL
COLOR, POC: NORMAL
CONTROL: NORMAL
GLUCOSE URINE, POC: NEGATIVE
KETONES, POC: NEGATIVE
LEUKOCYTE EST, POC: NEGATIVE
NITRITE, POC: NEGATIVE
PH, POC: 6
PREGNANCY TEST URINE, POC: NEGATIVE
PROTEIN, POC: NEGATIVE
SPECIFIC GRAVITY, POC: 1.03
UROBILINOGEN, POC: NORMAL

## 2021-12-27 PROCEDURE — 99214 OFFICE O/P EST MOD 30 MIN: CPT | Performed by: PHYSICIAN ASSISTANT

## 2021-12-27 PROCEDURE — 81025 URINE PREGNANCY TEST: CPT | Performed by: PHYSICIAN ASSISTANT

## 2021-12-27 PROCEDURE — 1036F TOBACCO NON-USER: CPT | Performed by: PHYSICIAN ASSISTANT

## 2021-12-27 PROCEDURE — G8427 DOCREV CUR MEDS BY ELIG CLIN: HCPCS | Performed by: PHYSICIAN ASSISTANT

## 2021-12-27 PROCEDURE — 81003 URINALYSIS AUTO W/O SCOPE: CPT | Performed by: PHYSICIAN ASSISTANT

## 2021-12-27 PROCEDURE — G8484 FLU IMMUNIZE NO ADMIN: HCPCS | Performed by: PHYSICIAN ASSISTANT

## 2021-12-27 PROCEDURE — G8420 CALC BMI NORM PARAMETERS: HCPCS | Performed by: PHYSICIAN ASSISTANT

## 2021-12-27 ASSESSMENT — ENCOUNTER SYMPTOMS
VOMITING: 0
PHOTOPHOBIA: 0
SORE THROAT: 0
ABDOMINAL DISTENTION: 0
CHEST TIGHTNESS: 0
RHINORRHEA: 0
COUGH: 0
CONSTIPATION: 0
EYE DISCHARGE: 0
DIARRHEA: 0
SINUS PRESSURE: 0
ABDOMINAL PAIN: 1
SHORTNESS OF BREATH: 0

## 2021-12-27 NOTE — PROGRESS NOTES
[address] [address]  Dept: [address] [place] [address]  is a 22 y.o. female Established patient, who presents today for her medical conditions/complaints as noted below. Chief Complaint   Patient presents with    Abdominal Pain     Started last night       HPI:     HPI: Abd pain since last night. It is lower left sided. This is the first time this has happened. Not sexually active. No concern for STD. No changes in period. No fever, no nausea, no diarrhea. Laying on stomach helps it. No position makes it worse. All normal BM no constipation. Reviewed prior notes None  Reviewed previous Labs    No results found for: LDLCHOLESTEROL, LDLCALC    (goal LDL is <100)   AST (U/L)   Date Value   02/20/2021 29     ALT (U/L)   Date Value   02/20/2021 15     BUN (mg/dL)   Date Value   09/01/2021 14     BP Readings from Last 3 Encounters:   12/27/21 110/60   09/02/21 110/78   08/19/21 112/60          (goal 120/80)    No past medical history on file. No past surgical history on file. Family History   Problem Relation Age of Onset    Diabetes Father     High Blood Pressure Father     Other Father     Cancer Paternal Uncle     Diabetes Paternal Grandmother        Social History     Tobacco Use    Smoking status: Never Smoker    Smokeless tobacco: Never Used   Substance Use Topics    Alcohol use: Never      Current Outpatient Medications   Medication Sig Dispense Refill    Norgestim-Eth Estrad Triphasic (TRI-SPRINTEC) 0.18/0.215/0.25 MG-35 MCG TABS Take 1 tablet by mouth daily 28 tablet 3    midodrine (PROAMATINE) 2.5 MG tablet Take 1 tablet by mouth 3 times daily 90 tablet 1     No current facility-administered medications for this visit.      No Known Allergies    Health Maintenance   Topic Date Due    Hepatitis C screen  Never done    HIV screen  Never done    Chlamydia screen  Never done    Flu vaccine (1) 09/01/2021    COVID-19 Vaccine (3 - Booster for Pfizer series) 11/16/2021    DTaP/Tdap/Td vaccine (6 - Td or Tdap) 07/30/2024    Hepatitis A vaccine  Completed    Hepatitis B vaccine  Completed    HPV vaccine  Completed    Varicella vaccine  Completed    Meningococcal (ACWY) vaccine  Completed    Hib vaccine  Aged Out    Pneumococcal 0-64 years Vaccine  Aged Out       Subjective:      Review of Systems   Constitutional: Negative for chills, fever and unexpected weight change. HENT: Negative for congestion, hearing loss, rhinorrhea, sinus pressure and sore throat. Eyes: Negative for photophobia, discharge and visual disturbance. Respiratory: Negative for cough, chest tightness and shortness of breath. Cardiovascular: Negative for chest pain, palpitations and leg swelling. Gastrointestinal: Positive for abdominal pain. Negative for abdominal distention, constipation, diarrhea and vomiting. Endocrine: Negative for polydipsia and polyuria. Genitourinary: Negative for decreased urine volume, difficulty urinating, frequency and urgency. Musculoskeletal: Negative for arthralgias, gait problem and myalgias. Skin: Negative for rash. Allergic/Immunologic: Negative for food allergies. Neurological: Negative for dizziness, weakness, numbness and headaches. Hematological: Negative for adenopathy. Psychiatric/Behavioral: Negative for dysphoric mood and sleep disturbance. The patient is not nervous/anxious. Objective:     /60   Pulse 112   Ht 5' 4.37\" (1.635 m)   Wt 116 lb 12.8 oz (53 kg)   SpO2 98%   BMI 19.82 kg/m²   Physical Exam  Constitutional:       General: She is not in acute distress. Appearance: Normal appearance. She is not ill-appearing. HENT:      Head: Normocephalic and atraumatic.       Right Ear: External ear normal.      Left Ear: External ear normal.      Nose: Nose normal.      Mouth/Throat:      Mouth: Mucous membranes are moist.   Eyes: Extraocular Movements: Extraocular movements intact. Conjunctiva/sclera: Conjunctivae normal.      Pupils: Pupils are equal, round, and reactive to light. Neck:      Vascular: No carotid bruit. Cardiovascular:      Rate and Rhythm: Normal rate and regular rhythm. Pulses: Normal pulses. Heart sounds: Normal heart sounds. Pulmonary:      Effort: Pulmonary effort is normal. No respiratory distress. Breath sounds: Normal breath sounds. Abdominal:      General: Bowel sounds are normal. There is no distension. Tenderness: There is no abdominal tenderness. Musculoskeletal:         General: Normal range of motion. Cervical back: Normal range of motion and neck supple. Lymphadenopathy:      Cervical: No cervical adenopathy. Skin:     General: Skin is warm and dry. Neurological:      General: No focal deficit present. Mental Status: She is alert and oriented to person, place, and time. Psychiatric:         Mood and Affect: Mood normal.         Behavior: Behavior normal.         Thought Content: Thought content normal.         Assessment and Plan:          1. Abdominal pain, unspecified abdominal location  -     POCT Urinalysis No Micro (Auto)  -     POCT urine pregnancy  -     CT ABDOMEN PELVIS W IV CONTRAST Additional Contrast? Radiologist Recommendation; Future  2. Acute left lower quadrant pain  -     CT ABDOMEN PELVIS W IV CONTRAST Additional Contrast? Radiologist Recommendation; Future             Patient given educational materials - see patient instructions. Discussed use, benefit, and side effects of prescribed medications. All patient questions answered. Pt voiced understanding. Reviewed health maintenance. Instructed to continue current medications, diet and exercise. Patient agreed with treatment plan. Follow up as directed.      Electronically signed by LUANA Zaidi on 12/27/2021 at 2:03 PM

## 2021-12-31 DIAGNOSIS — R42 LIGHTHEADEDNESS: ICD-10-CM

## 2021-12-31 DIAGNOSIS — I95.1 ORTHOSTATIC HYPOTENSION: ICD-10-CM

## 2022-01-03 ENCOUNTER — TELEPHONE (OUTPATIENT)
Dept: PRIMARY CARE CLINIC | Age: 22
End: 2022-01-03

## 2022-01-03 RX ORDER — MIDODRINE HYDROCHLORIDE 2.5 MG/1
TABLET ORAL
Qty: 90 TABLET | Refills: 1 | Status: SHIPPED | OUTPATIENT
Start: 2022-01-03

## 2022-01-03 NOTE — LETTER
[facility]  [facility]  [address] [address]  Phone: [phone]  Fax: [phone] Giovani Salgado CNP        January 3, 2022     Patient: Darrell Titus   YOB: 2000   Date of Visit: 1/3/2022       To Whom It May Concern: It is my medical opinion that Hernán Thurman may return to work on 01/04/21. If you have any questions or concerns, please don't hesitate to call.     Sincerely,        LYNNETTE Hensley - GERMAN

## 2022-01-03 NOTE — TELEPHONE ENCOUNTER
Patient states she did not go to work today - she states she has been having abdominal pain - OK for letter?

## 2022-01-03 NOTE — LETTER
[facility]  [place] [address]  [address] [address]  Phone: [phone]  Fax: [phone] [facility] Uzma Salgado CNP        January 3, 2022     Patient: Nell Fairbanks   YOB: 2000   Date of Visit: 1/3/2022       To Whom It May Concern: It is my medical opinion that Kailyn Pereira may return to work on 01/04/21. If you have any questions or concerns, please don't hesitate to call.     Sincerely,        LYNNETTE Velázquez CNP

## 2022-01-07 ENCOUNTER — OFFICE VISIT (OUTPATIENT)
Dept: PRIMARY CARE CLINIC | Age: 22
End: 2022-01-07
Payer: COMMERCIAL

## 2022-01-07 VITALS
OXYGEN SATURATION: 97 % | BODY MASS INDEX: 19.46 KG/M2 | HEIGHT: 64 IN | SYSTOLIC BLOOD PRESSURE: 116 MMHG | WEIGHT: 114 LBS | DIASTOLIC BLOOD PRESSURE: 64 MMHG | HEART RATE: 92 BPM

## 2022-01-07 DIAGNOSIS — H61.23 IMPACTED CERUMEN OF BOTH EARS: Primary | ICD-10-CM

## 2022-01-07 PROCEDURE — 69210 REMOVE IMPACTED EAR WAX UNI: CPT | Performed by: PHYSICIAN ASSISTANT

## 2022-01-07 PROCEDURE — G8484 FLU IMMUNIZE NO ADMIN: HCPCS | Performed by: PHYSICIAN ASSISTANT

## 2022-01-07 PROCEDURE — G8427 DOCREV CUR MEDS BY ELIG CLIN: HCPCS | Performed by: PHYSICIAN ASSISTANT

## 2022-01-07 PROCEDURE — G8420 CALC BMI NORM PARAMETERS: HCPCS | Performed by: PHYSICIAN ASSISTANT

## 2022-01-07 PROCEDURE — 1036F TOBACCO NON-USER: CPT | Performed by: PHYSICIAN ASSISTANT

## 2022-01-07 PROCEDURE — 99213 OFFICE O/P EST LOW 20 MIN: CPT | Performed by: PHYSICIAN ASSISTANT

## 2022-01-07 ASSESSMENT — ENCOUNTER SYMPTOMS
SORE THROAT: 0
PHOTOPHOBIA: 0
ABDOMINAL DISTENTION: 0
SHORTNESS OF BREATH: 0
CHEST TIGHTNESS: 0
COUGH: 0
EYE DISCHARGE: 0
SINUS PRESSURE: 0
DIARRHEA: 0
ABDOMINAL PAIN: 0
CONSTIPATION: 0
VOMITING: 0
RHINORRHEA: 0

## 2022-01-07 NOTE — PROGRESS NOTES
[address] [facility]  [address] [address]  Dept: [facility] is a 22 y.o. female Established patient, who presents today for her medical conditions/complaints as noted below. Chief Complaint   Patient presents with    Ear Fullness     Patient c/o ear fullness in right ear. no pain       HPI:     HPI: The patient has ear fullness in right ar without pain. No discharge pain or fever. Has needed ear wash before. Used debrox without relief. Debrox over last two day. Reviewed prior notes None  Reviewed previous Labs    No results found for: LDLCHOLESTEROL, LDLCALC    (goal LDL is <100)   AST (U/L)   Date Value   02/20/2021 29     ALT (U/L)   Date Value   02/20/2021 15     BUN (mg/dL)   Date Value   09/01/2021 14     BP Readings from Last 3 Encounters:   01/07/22 116/64   12/27/21 110/60   09/02/21 110/78          (goal 120/80)    No past medical history on file. No past surgical history on file. Family History   Problem Relation Age of Onset    Diabetes Father     High Blood Pressure Father     Other Father     Cancer Paternal Uncle     Diabetes Paternal Grandmother        Social History     Tobacco Use    Smoking status: Never Smoker    Smokeless tobacco: Never Used   Substance Use Topics    Alcohol use: Never      Current Outpatient Medications   Medication Sig Dispense Refill    midodrine (PROAMATINE) 2.5 MG tablet take 1 tablet by mouth three times a day 90 tablet 1    Norgestim-Eth Estrad Triphasic (TRI-SPRINTEC) 0.18/0.215/0.25 MG-35 MCG TABS Take 1 tablet by mouth daily 28 tablet 3     No current facility-administered medications for this visit.      No Known Allergies    Health Maintenance   Topic Date Due    Hepatitis C screen  Never done    HIV screen  Never done    Chlamydia screen  Never done    Flu vaccine (1) 09/01/2021    COVID-19 Vaccine (3 - Booster for Pfizer series) 11/16/2021    Depression Screen  03/25/2022    DTaP/Tdap/Td vaccine (6 - Td or Tdap) 07/30/2024    Hepatitis A vaccine  Completed    Hepatitis B vaccine  Completed    HPV vaccine  Completed    Varicella vaccine  Completed    Meningococcal (ACWY) vaccine  Completed    Hib vaccine  Aged Out    Pneumococcal 0-64 years Vaccine  Aged Out       Subjective:      Review of Systems   Constitutional: Negative for chills, fever and unexpected weight change. HENT: Positive for hearing loss. Negative for congestion, rhinorrhea, sinus pressure and sore throat. Eyes: Negative for photophobia, discharge and visual disturbance. Respiratory: Negative for cough, chest tightness and shortness of breath. Cardiovascular: Negative for chest pain, palpitations and leg swelling. Gastrointestinal: Negative for abdominal distention, abdominal pain, constipation, diarrhea and vomiting. Endocrine: Negative for polydipsia and polyuria. Genitourinary: Negative for decreased urine volume, difficulty urinating, frequency and urgency. Musculoskeletal: Negative for arthralgias, gait problem and myalgias. Skin: Negative for rash. Allergic/Immunologic: Negative for food allergies. Neurological: Negative for dizziness, weakness, numbness and headaches. Hematological: Negative for adenopathy. Psychiatric/Behavioral: Negative for dysphoric mood and sleep disturbance. The patient is not nervous/anxious. Objective:     /64   Pulse 92   Ht 5' 4\" (1.626 m)   Wt 114 lb (51.7 kg)   SpO2 97%   BMI 19.57 kg/m²   Physical Exam  Constitutional:       General: She is not in acute distress. Appearance: Normal appearance. She is not ill-appearing. HENT:      Head: Normocephalic and atraumatic. Right Ear: Tympanic membrane, ear canal and external ear normal. There is impacted cerumen. Left Ear: Tympanic membrane, ear canal and external ear normal. There is impacted cerumen.       Nose: Nose normal. Mouth/Throat:      Mouth: Mucous membranes are moist.   Eyes:      Extraocular Movements: Extraocular movements intact. Conjunctiva/sclera: Conjunctivae normal.      Pupils: Pupils are equal, round, and reactive to light. Neck:      Vascular: No carotid bruit. Cardiovascular:      Rate and Rhythm: Normal rate and regular rhythm. Pulses: Normal pulses. Heart sounds: Normal heart sounds. Pulmonary:      Effort: Pulmonary effort is normal. No respiratory distress. Breath sounds: Normal breath sounds. Abdominal:      General: Bowel sounds are normal. There is no distension. Tenderness: There is no abdominal tenderness. Musculoskeletal:         General: Normal range of motion. Cervical back: Normal range of motion and neck supple. Lymphadenopathy:      Cervical: No cervical adenopathy. Skin:     General: Skin is warm and dry. Neurological:      General: No focal deficit present. Mental Status: She is alert and oriented to person, place, and time. Psychiatric:         Mood and Affect: Mood normal.         Behavior: Behavior normal.         Thought Content: Thought content normal.         Assessment and Plan:          1. Impacted cerumen of both ears  -     524[address] - [place] REMOVE IMPACTED EAR WAX             Patient given educational materials - see patient instructions. Discussed use, benefit, and side effects of prescribed medications. All patient questions answered. Pt voiced understanding. Reviewed health maintenance. Instructed to continue current medications, diet and exercise. Patient agreed with treatment plan. Follow up as directed.      Electronically signed by LUANA Gonzalez on 1/7/2022 at 4:11 PM

## 2022-01-18 ENCOUNTER — TELEPHONE (OUTPATIENT)
Dept: PRIMARY CARE CLINIC | Age: 22
End: 2022-01-18

## 2022-01-18 NOTE — TELEPHONE ENCOUNTER
Okay for note. Patient should try getting FMLA and see GI if this is a recurrent problem. Have patient follow up with James Mcintosh regarding this.

## 2022-01-18 NOTE — TELEPHONE ENCOUNTER
Pt was here on 1/7/22. States that you told her, if she was having stomach issues she could call the office for a work note. Pt asking for an excuse for off work today. Would like to pick it up when ready. [phone] [address] for note?

## 2022-04-14 ENCOUNTER — OFFICE VISIT (OUTPATIENT)
Dept: PRIMARY CARE CLINIC | Age: 22
End: 2022-04-14
Payer: COMMERCIAL

## 2022-04-14 VITALS
HEART RATE: 98 BPM | WEIGHT: 117.6 LBS | DIASTOLIC BLOOD PRESSURE: 62 MMHG | SYSTOLIC BLOOD PRESSURE: 116 MMHG | BODY MASS INDEX: 20.19 KG/M2 | OXYGEN SATURATION: 98 %

## 2022-04-14 DIAGNOSIS — Z79.899 MEDICATION MANAGEMENT: Primary | ICD-10-CM

## 2022-04-14 DIAGNOSIS — F41.9 ANXIETY: ICD-10-CM

## 2022-04-14 PROCEDURE — 1036F TOBACCO NON-USER: CPT | Performed by: NURSE PRACTITIONER

## 2022-04-14 PROCEDURE — 99213 OFFICE O/P EST LOW 20 MIN: CPT | Performed by: NURSE PRACTITIONER

## 2022-04-14 PROCEDURE — G8420 CALC BMI NORM PARAMETERS: HCPCS | Performed by: NURSE PRACTITIONER

## 2022-04-14 PROCEDURE — G8427 DOCREV CUR MEDS BY ELIG CLIN: HCPCS | Performed by: NURSE PRACTITIONER

## 2022-04-14 RX ORDER — BUSPIRONE HYDROCHLORIDE 5 MG/1
5 TABLET ORAL 2 TIMES DAILY
Qty: 60 TABLET | Refills: 0 | Status: SHIPPED | OUTPATIENT
Start: 2022-04-14 | End: 2022-06-13 | Stop reason: SDUPTHER

## 2022-04-14 RX ORDER — POLYETHYLENE GLYCOL 3350 17 G/17G
POWDER, FOR SOLUTION ORAL
COMMUNITY
Start: 2022-03-08 | End: 2022-10-27

## 2022-04-14 RX ORDER — SERTRALINE HYDROCHLORIDE 25 MG/1
25 TABLET, FILM COATED ORAL DAILY
Qty: 30 TABLET | Refills: 3 | Status: SHIPPED | OUTPATIENT
Start: 2022-04-14 | End: 2022-06-13 | Stop reason: ALTCHOICE

## 2022-04-14 ASSESSMENT — PATIENT HEALTH QUESTIONNAIRE - PHQ9
2. FEELING DOWN, DEPRESSED OR HOPELESS: 0
SUM OF ALL RESPONSES TO PHQ9 QUESTIONS 1 & 2: 0
SUM OF ALL RESPONSES TO PHQ QUESTIONS 1-9: 0
1. LITTLE INTEREST OR PLEASURE IN DOING THINGS: 0

## 2022-04-14 ASSESSMENT — ENCOUNTER SYMPTOMS
RHINORRHEA: 0
EYE DISCHARGE: 0
ABDOMINAL PAIN: 0
VOMITING: 0
COUGH: 0
NAUSEA: 0
WHEEZING: 0
EYE REDNESS: 0
DIARRHEA: 0
SORE THROAT: 0
SHORTNESS OF BREATH: 0

## 2022-04-14 NOTE — PATIENT INSTRUCTIONS
Sertraline 25 mg daily  Buspirone 5 mg up to 2x/day as needed for anxiety  Encouraged to exercise daily  Encouraged to speak with a counselor or someone about her feelings.

## 2022-04-14 NOTE — PROGRESS NOTES
[address] [facility]  [address] [address]  Dept: [address] [place]  is a 22 y.o. female Established patient, who presents today for her medical conditions/complaints as noted below. Chief Complaint   Patient presents with    Anxiety     started last month -     Other     pt states when she wakes up in the morning she is very aggitated - started a few weeks ago - pt reports getting 8 -9 hours of sleep a night - denies napping during the day. HPI:     HPI  Anxiety has been high for awhile. She is irritable. She has trouble calming herself down when she gets angry. No one will do anything to her and she will be irritated. She broke up with he boy friend and she is living with her mom right now. Some mornings she wakes up and is agitated. Work is going OK. Home live is OK. No depression. No alcohol or drugs    Physically she feels good. She has energy. She goes to work everyday and does not miss work. She is busy on her day off. Reviewed prior notes None  Reviewed previous      No results found for: LDLCHOLESTEROL, LDLCALC    (goal LDL is <100)   AST (U/L)   Date Value   02/20/2021 29     ALT (U/L)   Date Value   02/20/2021 15     BUN (mg/dL)   Date Value   09/01/2021 14     BP Readings from Last 3 Encounters:   04/14/22 116/62   01/07/22 116/64   12/27/21 110/60          (goal 120/80)    No past medical history on file. No past surgical history on file.     Family History   Problem Relation Age of Onset    Diabetes Father     High Blood Pressure Father     Other Father     Cancer Paternal Uncle     Diabetes Paternal Grandmother        Social History     Tobacco Use    Smoking status: Never Smoker    Smokeless tobacco: Never Used   Substance Use Topics    Alcohol use: Never      Current Outpatient Medications   Medication Sig Dispense Refill    polyethylene glycol (GLYCOLAX) 17 GM/SCOOP powder take 17GM (DISSOLVED IN WATER) by mouth twice a day for constipation      sertraline (ZOLOFT) 25 MG tablet Take 1 tablet by mouth daily 30 tablet 3    busPIRone (BUSPAR) 5 MG tablet Take 1 tablet by mouth 2 times daily 60 tablet 0    midodrine (PROAMATINE) 2.5 MG tablet take 1 tablet by mouth three times a day 90 tablet 1    Norgestim-Eth Estrad Triphasic (TRI-SPRINTEC) 0.18/0.215/0.25 MG-35 MCG TABS Take 1 tablet by mouth daily 28 tablet 3     No current facility-administered medications for this visit. No Known Allergies    Health Maintenance   Topic Date Due    Hepatitis C screen  Never done    HIV screen  Never done    Chlamydia screen  Never done    COVID-19 Vaccine (3 - Booster for Pfizer series) 10/16/2021    Depression Screen  03/25/2022    Flu vaccine (Season Ended) 09/01/2022    DTaP/Tdap/Td vaccine (6 - Td or Tdap) 07/30/2024    Hepatitis A vaccine  Completed    Hepatitis B vaccine  Completed    HPV vaccine  Completed    Varicella vaccine  Completed    Meningococcal (ACWY) vaccine  Completed    Hib vaccine  Aged Out    Pneumococcal 0-64 years Vaccine  Aged Out       Subjective:      Review of Systems   Constitutional: Negative for chills and fever. HENT: Negative for rhinorrhea and sore throat. Eyes: Negative for discharge and redness. Respiratory: Negative for cough, shortness of breath and wheezing. Cardiovascular: Negative for chest pain and palpitations. Gastrointestinal: Negative for abdominal pain, diarrhea, nausea and vomiting. Genitourinary: Negative for dysuria and frequency. Musculoskeletal: Negative for arthralgias and myalgias. Neurological: Negative for dizziness, light-headedness and headaches. Psychiatric/Behavioral: Positive for agitation. Negative for dysphoric mood and sleep disturbance. The patient is nervous/anxious.         Objective:     /62   Pulse 98   Wt 117 lb 9.6 oz (53.3 kg)   SpO2 98%   BMI 20.19 kg/m²   Physical Exam  Vitals and nursing note reviewed. Constitutional:       General: She is not in acute distress. Appearance: She is well-developed. She is not ill-appearing. HENT:      Head: Normocephalic and atraumatic. Right Ear: External ear normal.      Left Ear: External ear normal.   Eyes:      Conjunctiva/sclera: Conjunctivae normal.      Pupils: Pupils are equal, round, and reactive to light. Neck:      Thyroid: No thyromegaly. Trachea: No tracheal deviation. Cardiovascular:      Rate and Rhythm: Normal rate and regular rhythm. Heart sounds: Normal heart sounds. Comments: No carotid bruit  Pulmonary:      Effort: Pulmonary effort is normal. No respiratory distress. Breath sounds: Normal breath sounds. No wheezing. Abdominal:      General: Bowel sounds are normal.      Palpations: Abdomen is soft. Lymphadenopathy:      Cervical: No cervical adenopathy. Skin:     General: Skin is warm. Findings: No rash. Neurological:      Mental Status: She is alert and oriented to person, place, and time. Motor: No weakness. Gait: Gait normal.   Psychiatric:         Mood and Affect: Mood normal.         Behavior: Behavior normal.         Thought Content: Thought content normal.         Assessment/Plan:   1. Medication management  -     sertraline (ZOLOFT) 25 MG tablet; Take 1 tablet by mouth daily, Disp-30 tablet, R-3Normal  -     busPIRone (BUSPAR) 5 MG tablet; Take 1 tablet by mouth 2 times daily, Disp-60 tablet, R-0Normal  2. Anxiety  -     sertraline (ZOLOFT) 25 MG tablet; Take 1 tablet by mouth daily, Disp-30 tablet, R-3Normal  -     busPIRone (BUSPAR) 5 MG tablet; Take 1 tablet by mouth 2 times daily, Disp-60 tablet, R-0Normal     Sertraline 25 mg daily  Buspirone 5 mg up to 2x/day as needed for anxiety  Encouraged to exercise daily  Encouraged to speak with a counselor or someone about her feelings. Return in about 6 weeks (around 5/26/2022) for anxiety.     No orders of the defined types were placed in this encounter. Orders Placed This Encounter   Medications    sertraline (ZOLOFT) 25 MG tablet     Sig: Take 1 tablet by mouth daily     Dispense:  30 tablet     Refill:  3    busPIRone (BUSPAR) 5 MG tablet     Sig: Take 1 tablet by mouth 2 times daily     Dispense:  60 tablet     Refill:  0       Patient given educational materials - see patient instructions. Discussed use, benefit, and side effects of prescribed medications. All patient questions answered. Pt voiced understanding. Reviewed health maintenance. Instructed to continue current medications, diet and exercise. Patient agreed with treatment plan. Follow up as directed.      Electronically signed by LYNNETTE Fox CNP on 4/14/2022 at 3:48 PM

## 2022-05-19 ENCOUNTER — TELEPHONE (OUTPATIENT)
Dept: PRIMARY CARE CLINIC | Age: 22
End: 2022-05-19

## 2022-05-20 ENCOUNTER — TELEPHONE (OUTPATIENT)
Dept: PRIMARY CARE CLINIC | Age: 22
End: 2022-05-20

## 2022-05-20 ENCOUNTER — HOSPITAL ENCOUNTER (OUTPATIENT)
Age: 22
Setting detail: SPECIMEN
Discharge: HOME | End: 2022-05-20

## 2022-05-20 ENCOUNTER — NURSE ONLY (OUTPATIENT)
Dept: PRIMARY CARE CLINIC | Age: 22
End: 2022-05-20
Payer: COMMERCIAL

## 2022-05-20 DIAGNOSIS — R31.9 URINARY TRACT INFECTION WITH HEMATURIA, SITE UNSPECIFIED: ICD-10-CM

## 2022-05-20 DIAGNOSIS — R31.9 URINARY TRACT INFECTION WITH HEMATURIA, SITE UNSPECIFIED: Primary | ICD-10-CM

## 2022-05-20 DIAGNOSIS — N39.0 URINARY TRACT INFECTION WITHOUT HEMATURIA, SITE UNSPECIFIED: Primary | ICD-10-CM

## 2022-05-20 DIAGNOSIS — R81 GLUCOSURIA: Primary | ICD-10-CM

## 2022-05-20 DIAGNOSIS — N39.0 URINARY TRACT INFECTION WITH HEMATURIA, SITE UNSPECIFIED: ICD-10-CM

## 2022-05-20 DIAGNOSIS — N39.0 URINARY TRACT INFECTION WITH HEMATURIA, SITE UNSPECIFIED: Primary | ICD-10-CM

## 2022-05-20 LAB
BILIRUBIN, POC: NORMAL
BLOOD URINE, POC: NORMAL
CLARITY, POC: NORMAL
COLOR, POC: NORMAL
GLUCOSE URINE, POC: NORMAL
KETONES, POC: NEGATIVE
LEUKOCYTE EST, POC: NORMAL
NITRITE, POC: POSITIVE
PH, POC: 5
PROTEIN, POC: NORMAL
SPECIFIC GRAVITY, POC: 1.02
UROBILINOGEN, POC: 2

## 2022-05-20 PROCEDURE — 81002 URINALYSIS NONAUTO W/O SCOPE: CPT | Performed by: FAMILY MEDICINE

## 2022-05-20 RX ORDER — AMOXICILLIN 500 MG/1
500 CAPSULE ORAL 3 TIMES DAILY
Qty: 15 CAPSULE | Refills: 0 | Status: SHIPPED | OUTPATIENT
Start: 2022-05-20 | End: 2022-05-25

## 2022-05-20 NOTE — TELEPHONE ENCOUNTER
Patient was in today for [place]     Please send antibiotic to [facility] aid [place] listed in chart per patient     Thank you

## 2022-05-20 NOTE — TELEPHONE ENCOUNTER
Sent in amoxicillin  Let patient know that antibiotics can decrease the effectiveness of BCP and to use a second protection.

## 2022-05-20 NOTE — PROGRESS NOTES
SUBJECTIVE: Britt Arias is a 22 y.o. female who complains of burning sensation, urinary frequency, urgency and dysuria 2-3 days, without flank pain, fever, chills, or abnormal vaginal discharge or bleeding. OBJECTIVE: Appears well, in no apparent distress. Vital signs are normal.  Urine dipstick shows positive for glucose, positive for nitrates and positive for leukocytes. Micro exam: not done. ASSESSMENT: UTI uncomplicated without evidence of pyelonephritis. PLAN: Treatment per orders - also push fluids. Call or return to clinic prn if these symptoms worsen or fail to improve as anticipated. Per March Serum will treat for UTI until culture is back. Patient advised to take antibiotic as directed and to push fluids and call office with any concerns.

## 2022-05-22 LAB
CULTURE: ABNORMAL
SPECIMEN DESCRIPTION: ABNORMAL

## 2022-06-08 DIAGNOSIS — R81 GLUCOSURIA: ICD-10-CM

## 2022-06-08 LAB — GLUCOSE FASTING: 100 MG/DL (ref 70–99)

## 2022-06-13 ENCOUNTER — OFFICE VISIT (OUTPATIENT)
Dept: PRIMARY CARE CLINIC | Age: 22
End: 2022-06-13
Payer: COMMERCIAL

## 2022-06-13 VITALS
BODY MASS INDEX: 19.33 KG/M2 | SYSTOLIC BLOOD PRESSURE: 118 MMHG | HEIGHT: 64 IN | DIASTOLIC BLOOD PRESSURE: 68 MMHG | WEIGHT: 113.2 LBS | OXYGEN SATURATION: 98 % | HEART RATE: 101 BPM

## 2022-06-13 DIAGNOSIS — F32.A DEPRESSION, UNSPECIFIED DEPRESSION TYPE: Primary | ICD-10-CM

## 2022-06-13 DIAGNOSIS — Z79.899 MEDICATION MANAGEMENT: ICD-10-CM

## 2022-06-13 DIAGNOSIS — F41.9 ANXIETY: ICD-10-CM

## 2022-06-13 PROCEDURE — G8420 CALC BMI NORM PARAMETERS: HCPCS | Performed by: PHYSICIAN ASSISTANT

## 2022-06-13 PROCEDURE — G8427 DOCREV CUR MEDS BY ELIG CLIN: HCPCS | Performed by: PHYSICIAN ASSISTANT

## 2022-06-13 PROCEDURE — 1036F TOBACCO NON-USER: CPT | Performed by: PHYSICIAN ASSISTANT

## 2022-06-13 PROCEDURE — 99214 OFFICE O/P EST MOD 30 MIN: CPT | Performed by: PHYSICIAN ASSISTANT

## 2022-06-13 RX ORDER — BUSPIRONE HYDROCHLORIDE 5 MG/1
5 TABLET ORAL 2 TIMES DAILY
Qty: 60 TABLET | Refills: 0
Start: 2022-06-13 | End: 2022-06-23

## 2022-06-13 RX ORDER — BUPROPION HYDROCHLORIDE 150 MG/1
150 TABLET ORAL EVERY MORNING
Qty: 30 TABLET | Refills: 3 | Status: SHIPPED | OUTPATIENT
Start: 2022-06-13 | End: 2022-10-27

## 2022-06-13 ASSESSMENT — ENCOUNTER SYMPTOMS
VOMITING: 0
EYE DISCHARGE: 0
RHINORRHEA: 0
ABDOMINAL PAIN: 0
SORE THROAT: 0
CHEST TIGHTNESS: 0
SHORTNESS OF BREATH: 0
SINUS PRESSURE: 0
COUGH: 0
CONSTIPATION: 0
PHOTOPHOBIA: 0
ABDOMINAL DISTENTION: 0
DIARRHEA: 0

## 2022-06-13 ASSESSMENT — PATIENT HEALTH QUESTIONNAIRE - PHQ9
SUM OF ALL RESPONSES TO PHQ QUESTIONS 1-9: 15
5. POOR APPETITE OR OVEREATING: 1
SUM OF ALL RESPONSES TO PHQ QUESTIONS 1-9: 15
3. TROUBLE FALLING OR STAYING ASLEEP: 1
7. TROUBLE CONCENTRATING ON THINGS, SUCH AS READING THE NEWSPAPER OR WATCHING TELEVISION: 1
SUM OF ALL RESPONSES TO PHQ QUESTIONS 1-9: 15
10. IF YOU CHECKED OFF ANY PROBLEMS, HOW DIFFICULT HAVE THESE PROBLEMS MADE IT FOR YOU TO DO YOUR WORK, TAKE CARE OF THINGS AT HOME, OR GET ALONG WITH OTHER PEOPLE: 0
9. THOUGHTS THAT YOU WOULD BE BETTER OFF DEAD, OR OF HURTING YOURSELF: 0
6. FEELING BAD ABOUT YOURSELF - OR THAT YOU ARE A FAILURE OR HAVE LET YOURSELF OR YOUR FAMILY DOWN: 3
SUM OF ALL RESPONSES TO PHQ9 QUESTIONS 1 & 2: 6
8. MOVING OR SPEAKING SO SLOWLY THAT OTHER PEOPLE COULD HAVE NOTICED. OR THE OPPOSITE, BEING SO FIGETY OR RESTLESS THAT YOU HAVE BEEN MOVING AROUND A LOT MORE THAN USUAL: 0
SUM OF ALL RESPONSES TO PHQ QUESTIONS 1-9: 15
4. FEELING TIRED OR HAVING LITTLE ENERGY: 3
2. FEELING DOWN, DEPRESSED OR HOPELESS: 3
1. LITTLE INTEREST OR PLEASURE IN DOING THINGS: 3

## 2022-06-13 NOTE — PROGRESS NOTES
[facility]  [address] [address]  Dept: [facility] is a 22 y.o. female Established patient, who presents today for her medical conditions/complaints as noted below. Chief Complaint   Patient presents with    Depression       HPI:     HPI: the patient has had depression worsened over the last 2 weeks. She has had zero motivation. Some crying spells. No thoughts of hurting self or others. No suicide plan. Has been on zoloft in the past but felt tired and out of it. Took it only for about a day. Did not like side effects even at small dose. Never taken another antidepressant in the past. Has some people for support so she does not want therapy at this time. Reviewed prior notes None  Reviewed previous Labs    No results found for: LDLCHOLESTEROL, LDLCALC    (goal LDL is <100)   AST (U/L)   Date Value   02/20/2021 29     ALT (U/L)   Date Value   02/20/2021 15     BUN (mg/dL)   Date Value   09/01/2021 14     BP Readings from Last 3 Encounters:   06/13/22 118/68   04/14/22 116/62   01/07/22 116/64          (goal 120/80)    No past medical history on file. No past surgical history on file.     Family History   Problem Relation Age of Onset    Diabetes Father     High Blood Pressure Father     Other Father     Cancer Paternal Uncle     Diabetes Paternal Grandmother        Social History     Tobacco Use    Smoking status: Never Smoker    Smokeless tobacco: Never Used   Substance Use Topics    Alcohol use: Never      Current Outpatient Medications   Medication Sig Dispense Refill    buPROPion (WELLBUTRIN XL) 150 MG extended release tablet Take 1 tablet by mouth every morning 30 tablet 3    busPIRone (BUSPAR) 5 MG tablet Take 1 tablet by mouth 2 times daily 60 tablet 0    polyethylene glycol (GLYCOLAX) 17 GM/SCOOP powder take 17GM (DISSOLVED IN WATER) by mouth twice a day for constipation      midodrine (PROAMATINE) 2.5 MG tablet take 1 tablet by mouth three times a day 90 tablet 1    Norgestim-Eth Estrad Triphasic (TRI-SPRINTEC) 0.18/0.215/0.25 MG-35 MCG TABS Take 1 tablet by mouth daily 28 tablet 3     No current facility-administered medications for this visit. No Known Allergies    Health Maintenance   Topic Date Due    HIV screen  Never done    Chlamydia screen  Never done    Hepatitis C screen  Never done    COVID-19 Vaccine (3 - Booster for Pfizer series) 10/16/2021    Flu vaccine (Season Ended) 09/01/2022    Depression Screen  04/14/2023    DTaP/Tdap/Td vaccine (6 - Td or Tdap) 07/30/2024    Hepatitis A vaccine  Completed    Hepatitis B vaccine  Completed    HPV vaccine  Completed    Varicella vaccine  Completed    Meningococcal (ACWY) vaccine  Completed    Hib vaccine  Aged Out    Pneumococcal 0-64 years Vaccine  Aged Out       Subjective:      Review of Systems   Constitutional: Negative for chills, fever and unexpected weight change. HENT: Negative for congestion, hearing loss, rhinorrhea, sinus pressure and sore throat. Eyes: Negative for photophobia, discharge and visual disturbance. Respiratory: Negative for cough, chest tightness and shortness of breath. Cardiovascular: Negative for chest pain, palpitations and leg swelling. Gastrointestinal: Negative for abdominal distention, abdominal pain, constipation, diarrhea and vomiting. Endocrine: Negative for polydipsia and polyuria. Genitourinary: Negative for decreased urine volume, difficulty urinating, frequency and urgency. Musculoskeletal: Negative for arthralgias, gait problem and myalgias. Skin: Negative for rash. Allergic/Immunologic: Negative for food allergies. Neurological: Negative for dizziness, weakness, numbness and headaches. Hematological: Negative for adenopathy. Psychiatric/Behavioral: Negative for dysphoric mood and sleep disturbance. The patient is not nervous/anxious.         Objective: /68   Pulse (!) 101   Ht 5' 4\" (1.626 m)   Wt 113 lb 3.2 oz (51.3 kg)   SpO2 98%   BMI 19.43 kg/m²   Physical Exam  Constitutional:       General: She is not in acute distress. Appearance: Normal appearance. She is not ill-appearing. HENT:      Head: Normocephalic and atraumatic. Right Ear: External ear normal.      Left Ear: External ear normal.      Nose: Nose normal.      Mouth/Throat:      Mouth: Mucous membranes are moist.   Eyes:      Extraocular Movements: Extraocular movements intact. Conjunctiva/sclera: Conjunctivae normal.      Pupils: Pupils are equal, round, and reactive to light. Neck:      Vascular: No carotid bruit. Cardiovascular:      Rate and Rhythm: Normal rate and regular rhythm. Pulses: Normal pulses. Heart sounds: Normal heart sounds. Pulmonary:      Effort: Pulmonary effort is normal. No respiratory distress. Breath sounds: Normal breath sounds. Abdominal:      General: Bowel sounds are normal. There is no distension. Tenderness: There is no abdominal tenderness. Musculoskeletal:         General: Normal range of motion. Cervical back: Normal range of motion and neck supple. Lymphadenopathy:      Cervical: No cervical adenopathy. Skin:     General: Skin is warm and dry. Neurological:      General: No focal deficit present. Mental Status: She is alert and oriented to person, place, and time. Psychiatric:         Mood and Affect: Mood normal.         Behavior: Behavior normal.         Thought Content: Thought content normal.         Assessment and Plan:          1. Depression, unspecified depression type  -     buPROPion (WELLBUTRIN XL) 150 MG extended release tablet; Take 1 tablet by mouth every morning, Disp-30 tablet, R-3Normal  2. Medication management  -     busPIRone (BUSPAR) 5 MG tablet; Take 1 tablet by mouth 2 times daily, Disp-60 tablet, R-0NO PRINT  3. Anxiety  -     busPIRone (BUSPAR) 5 MG tablet;  Take 1 tablet by mouth 2 times daily, Disp-60 tablet, R-0NO PRINT   Continue with buspar. Patient given educational materials - see patient instructions. Discussed use, benefit, and side effects of prescribed medications. All patient questions answered. Pt voiced understanding. Reviewed health maintenance. Instructed to continue current medications, diet and exercise. Patient agreed with treatment plan. Follow up as directed.      Electronically signed by LUANA Zamora on 6/13/2022 at 4:21 PM

## 2022-06-23 ENCOUNTER — OFFICE VISIT (OUTPATIENT)
Dept: PRIMARY CARE CLINIC | Age: 22
End: 2022-06-23
Payer: COMMERCIAL

## 2022-06-23 VITALS
BODY MASS INDEX: 19.5 KG/M2 | WEIGHT: 113.6 LBS | OXYGEN SATURATION: 98 % | DIASTOLIC BLOOD PRESSURE: 64 MMHG | HEART RATE: 106 BPM | SYSTOLIC BLOOD PRESSURE: 102 MMHG

## 2022-06-23 DIAGNOSIS — Z79.899 MEDICATION MANAGEMENT: Primary | ICD-10-CM

## 2022-06-23 DIAGNOSIS — F41.9 ANXIETY: ICD-10-CM

## 2022-06-23 PROCEDURE — 99213 OFFICE O/P EST LOW 20 MIN: CPT | Performed by: NURSE PRACTITIONER

## 2022-06-23 PROCEDURE — 1036F TOBACCO NON-USER: CPT | Performed by: NURSE PRACTITIONER

## 2022-06-23 PROCEDURE — G8427 DOCREV CUR MEDS BY ELIG CLIN: HCPCS | Performed by: NURSE PRACTITIONER

## 2022-06-23 PROCEDURE — G8420 CALC BMI NORM PARAMETERS: HCPCS | Performed by: NURSE PRACTITIONER

## 2022-06-23 ASSESSMENT — ENCOUNTER SYMPTOMS
VOMITING: 0
RHINORRHEA: 0
EYE REDNESS: 0
COUGH: 0
SORE THROAT: 0
SHORTNESS OF BREATH: 0
NAUSEA: 0
WHEEZING: 0
EYE DISCHARGE: 0
ABDOMINAL PAIN: 0
DIARRHEA: 0

## 2022-06-23 NOTE — PROGRESS NOTES
[address] [address]  Dept: [address] [place] [address]  is a 22 y.o. female Established patient, who presents today for her medical conditions/complaints as noted below. Chief Complaint   Patient presents with    Anxiety    Discuss Labs       HPI:     HPI  Anxiety is better - she is taking it every day  She does not like the way the Buspar makes her feel so she is not taking. No more crying spells. No thoughts of hurting self. Irritability is not happening      Reviewed prior notes: Previous PCP   Reviewed previous:       No results found for: LDLCHOLESTEROL, LDLCALC    (goal LDL is <100)   AST (U/L)   Date Value   02/20/2021 29     ALT (U/L)   Date Value   02/20/2021 15     BUN (mg/dL)   Date Value   09/01/2021 14     BP Readings from Last 3 Encounters:   06/23/22 102/64   06/13/22 118/68   04/14/22 116/62          (goal 120/80)    No past medical history on file. No past surgical history on file. Family History   Problem Relation Age of Onset    Diabetes Father     High Blood Pressure Father     Other Father     Cancer Paternal Uncle     Diabetes Paternal Grandmother        Social History     Tobacco Use    Smoking status: Never Smoker    Smokeless tobacco: Never Used   Substance Use Topics    Alcohol use: Never      Current Outpatient Medications   Medication Sig Dispense Refill    buPROPion (WELLBUTRIN XL) 150 MG extended release tablet Take 1 tablet by mouth every morning 30 tablet 3    polyethylene glycol (GLYCOLAX) 17 GM/SCOOP powder take 17GM (DISSOLVED IN WATER) by mouth twice a day for constipation      midodrine (PROAMATINE) 2.5 MG tablet take 1 tablet by mouth three times a day 90 tablet 1    Norgestim-Eth Estrad Triphasic (TRI-SPRINTEC) 0.18/0.215/0.25 MG-35 MCG TABS Take 1 tablet by mouth daily 28 tablet 3     No current facility-administered medications for this visit.      No Known Normal heart sounds. Pulmonary:      Effort: Pulmonary effort is normal. No respiratory distress. Breath sounds: Normal breath sounds. No wheezing. Lymphadenopathy:      Cervical: No cervical adenopathy. Skin:     General: Skin is warm. Findings: No rash. Neurological:      Mental Status: She is alert and oriented to person, place, and time. Psychiatric:         Mood and Affect: Mood normal.         Behavior: Behavior normal.         Thought Content: Thought content normal.         Assessment/Plan:   1. Medication management  2. Anxiety     Continue bupropion daily    Return in about 6 months (around 12/23/2022) for anxiety. Data Unavailable     No orders of the defined types were placed in this encounter. No orders of the defined types were placed in this encounter. Patient given educational materials - see patient instructions. Discussed use, benefit, and side effects of prescribed medications. All patient questions answered. Pt voiced understanding. Reviewed health maintenance. Instructed to continue current medications, diet and exercise. Patient agreed with treatment plan. Follow up as directed.      Electronically signed by LYNNETTE Rosales CNP on 6/23/2022 at 3:01 PM

## 2022-08-23 ENCOUNTER — OFFICE VISIT (OUTPATIENT)
Dept: PRIMARY CARE CLINIC | Age: 22
End: 2022-08-23
Payer: COMMERCIAL

## 2022-08-23 VITALS — OXYGEN SATURATION: 98 % | TEMPERATURE: 97.8 F | BODY MASS INDEX: 19.5 KG/M2 | WEIGHT: 113.6 LBS | HEART RATE: 95 BPM

## 2022-08-23 DIAGNOSIS — R05.9 COUGH: Primary | ICD-10-CM

## 2022-08-23 PROCEDURE — G8427 DOCREV CUR MEDS BY ELIG CLIN: HCPCS | Performed by: PHYSICIAN ASSISTANT

## 2022-08-23 PROCEDURE — G8420 CALC BMI NORM PARAMETERS: HCPCS | Performed by: PHYSICIAN ASSISTANT

## 2022-08-23 PROCEDURE — 1036F TOBACCO NON-USER: CPT | Performed by: PHYSICIAN ASSISTANT

## 2022-08-23 PROCEDURE — 99213 OFFICE O/P EST LOW 20 MIN: CPT | Performed by: PHYSICIAN ASSISTANT

## 2022-08-23 RX ORDER — PREDNISONE 10 MG/1
10 TABLET ORAL 2 TIMES DAILY
Qty: 10 TABLET | Refills: 0 | Status: SHIPPED | OUTPATIENT
Start: 2022-08-23 | End: 2022-08-28

## 2022-08-23 RX ORDER — BENZONATATE 100 MG/1
100 CAPSULE ORAL 3 TIMES DAILY PRN
Qty: 30 CAPSULE | Refills: 0 | Status: SHIPPED | OUTPATIENT
Start: 2022-08-23 | End: 2022-09-02

## 2022-08-23 RX ORDER — AZITHROMYCIN 250 MG/1
TABLET, FILM COATED ORAL
Qty: 1 PACKET | Refills: 0 | Status: SHIPPED | OUTPATIENT
Start: 2022-08-23 | End: 2022-09-02

## 2022-08-23 SDOH — ECONOMIC STABILITY: FOOD INSECURITY: WITHIN THE PAST 12 MONTHS, YOU WORRIED THAT YOUR FOOD WOULD RUN OUT BEFORE YOU GOT MONEY TO BUY MORE.: NEVER TRUE

## 2022-08-23 SDOH — ECONOMIC STABILITY: FOOD INSECURITY: WITHIN THE PAST 12 MONTHS, THE FOOD YOU BOUGHT JUST DIDN'T LAST AND YOU DIDN'T HAVE MONEY TO GET MORE.: NEVER TRUE

## 2022-08-23 ASSESSMENT — ENCOUNTER SYMPTOMS
RHINORRHEA: 0
SORE THROAT: 1
COUGH: 1
CHEST TIGHTNESS: 0
DIARRHEA: 0
VOMITING: 0
ABDOMINAL DISTENTION: 0
CONSTIPATION: 0
SHORTNESS OF BREATH: 0
ABDOMINAL PAIN: 0
SINUS PRESSURE: 0

## 2022-08-23 ASSESSMENT — SOCIAL DETERMINANTS OF HEALTH (SDOH): HOW HARD IS IT FOR YOU TO PAY FOR THE VERY BASICS LIKE FOOD, HOUSING, MEDICAL CARE, AND HEATING?: SOMEWHAT HARD

## 2022-08-23 NOTE — PROGRESS NOTES
[address] [address]  Dept: [address] [place] [address]  is a 22 y.o. female Established patient, who presents today for her medical conditions/complaints as noted below. Chief Complaint   Patient presents with    Cough     x10 days. Dry cough. Pharyngitis     X10 days       HPI:     Cough  Associated symptoms include a sore throat. Pertinent negatives include no chest pain, chills, fever, myalgias, rash, rhinorrhea or shortness of breath. Pharyngitis  Associated symptoms include coughing and a sore throat. Pertinent negatives include no abdominal pain, arthralgias, chest pain, chills, congestion, fever, myalgias, rash or vomiting. : The patient is having cough. Not getting anything up. Painful in throat. No atb recently. She is taking dayquil and nyquil which is not helping at this time. Reviewed prior notes None  Reviewed previous Labs    No results found for: LDLCHOLESTEROL, LDLCALC    (goal LDL is <100)   AST (U/L)   Date Value   02/20/2021 29     ALT (U/L)   Date Value   02/20/2021 15     BUN (mg/dL)   Date Value   09/01/2021 14     BP Readings from Last 3 Encounters:   06/23/22 102/64   06/13/22 118/68   04/14/22 116/62          (goal 120/80)  No results found for: LABA1C  No results found for: EAG  No past medical history on file. No past surgical history on file.     Family History   Problem Relation Age of Onset    Diabetes Father     High Blood Pressure Father     Other Father     Cancer Paternal Uncle     Diabetes Paternal Grandmother        Social History     Tobacco Use    Smoking status: Never    Smokeless tobacco: Never   Substance Use Topics    Alcohol use: Never      Current Outpatient Medications   Medication Sig Dispense Refill    Doxylamine-DM (VICKS DAYQUIL/NYQUIL COUGH PO) Take by mouth      benzonatate (TESSALON PERLES) 100 MG capsule Take 1 capsule by mouth 3 times daily as needed for Cough 30 capsule 0    azithromycin (ZITHROMAX) 250 MG tablet 2 tablets now then 1 daily until gone. 1 packet 0    predniSONE (DELTASONE) 10 MG tablet Take 1 tablet by mouth 2 times daily for 5 days 10 tablet 0    buPROPion (WELLBUTRIN XL) 150 MG extended release tablet Take 1 tablet by mouth every morning 30 tablet 3    polyethylene glycol (GLYCOLAX) 17 GM/SCOOP powder take 17GM (DISSOLVED IN WATER) by mouth twice a day for constipation      midodrine (PROAMATINE) 2.5 MG tablet take 1 tablet by mouth three times a day 90 tablet 1    Norgestim-Eth Estrad Triphasic (TRI-SPRINTEC) 0.18/0.215/0.25 MG-35 MCG TABS Take 1 tablet by mouth daily 28 tablet 3     No current facility-administered medications for this visit. No Known Allergies    Health Maintenance   Topic Date Due    HIV screen  Never done    Chlamydia screen  Never done    Hepatitis C screen  Never done    COVID-19 Vaccine (3 - Booster for Pfizer series) 10/16/2021    Flu vaccine (1) 09/01/2022    Depression Monitoring  06/13/2023    DTaP/Tdap/Td vaccine (6 - Td or Tdap) 07/30/2024    Hepatitis A vaccine  Completed    Hepatitis B vaccine  Completed    HPV vaccine  Completed    Varicella vaccine  Completed    Meningococcal (ACWY) vaccine  Completed    Hib vaccine  Aged Out    Pneumococcal 0-64 years Vaccine  Aged Out       Subjective:      Review of Systems   Constitutional:  Negative for chills, fever and unexpected weight change. HENT:  Positive for sore throat. Negative for congestion, hearing loss, rhinorrhea and sinus pressure. Respiratory:  Positive for cough. Negative for chest tightness and shortness of breath. Cardiovascular:  Negative for chest pain, palpitations and leg swelling. Gastrointestinal:  Negative for abdominal distention, abdominal pain, constipation, diarrhea and vomiting. Musculoskeletal:  Negative for arthralgias, gait problem and myalgias. Skin:  Negative for rash. Neurological:  Negative for dizziness. Objective:     Pulse 95   Temp 97.8 °F (36.6 °C) (Infrared)   Wt 113 lb 9.6 oz (51.5 kg)   LMP 07/29/2022   SpO2 98%   BMI 19.50 kg/m²   Physical Exam  Constitutional:       General: She is not in acute distress. Appearance: Normal appearance. She is not ill-appearing. HENT:      Head: Normocephalic and atraumatic. Right Ear: External ear normal.      Left Ear: External ear normal.      Nose: Nose normal.      Mouth/Throat:      Mouth: Mucous membranes are moist.   Eyes:      Extraocular Movements: Extraocular movements intact. Conjunctiva/sclera: Conjunctivae normal.   Cardiovascular:      Rate and Rhythm: Normal rate and regular rhythm. Pulses: Normal pulses. Heart sounds: Normal heart sounds. Pulmonary:      Effort: Pulmonary effort is normal. No respiratory distress. Breath sounds: Normal breath sounds. Abdominal:      General: Bowel sounds are normal. There is no distension. Tenderness: There is no abdominal tenderness. Lymphadenopathy:      Cervical: No cervical adenopathy. Skin:     General: Skin is dry. Neurological:      Mental Status: She is alert and oriented to person, place, and time. Psychiatric:         Behavior: Behavior normal.       Assessment and Plan:          1. Cough  -     benzonatate (TESSALON PERLES) 100 MG capsule; Take 1 capsule by mouth 3 times daily as needed for Cough, Disp-30 capsule, R-0Normal  -     azithromycin (ZITHROMAX) 250 MG tablet; 2 tablets now then 1 daily until gone., Disp-1 packet, R-0Normal  -     predniSONE (DELTASONE) 10 MG tablet; Take 1 tablet by mouth 2 times daily for 5 days, Disp-10 tablet, R-0Normal           No follow-ups on file. Patient given educational materials - see patient instructions. Discussed use, benefit, and side effects of prescribed medications. All patient questions answered. Pt voiced understanding. Reviewed health maintenance.   Instructed to continue current medications, diet and exercise. Patient agreed with treatment plan. Follow up as directed.      Electronically signed by LUANA Bennett on 8/23/2022 at 3:51 PM

## 2022-09-15 ENCOUNTER — TELEPHONE (OUTPATIENT)
Dept: PRIMARY CARE CLINIC | Age: 22
End: 2022-09-15

## 2022-09-15 NOTE — TELEPHONE ENCOUNTER
Pt asking for an off work note today. Stated that a family member was rushed to the hosp today. Will  note.

## 2022-09-21 ENCOUNTER — TELEPHONE (OUTPATIENT)
Dept: PRIMARY CARE CLINIC | Age: 22
End: 2022-09-21

## 2022-09-21 RX ORDER — GUAIFENESIN 600 MG/1
600 TABLET, EXTENDED RELEASE ORAL 2 TIMES DAILY
Qty: 30 TABLET | Refills: 0 | Status: SHIPPED | OUTPATIENT
Start: 2022-09-21 | End: 2022-10-06

## 2022-09-21 NOTE — TELEPHONE ENCOUNTER
Mucinex sent to pharmacy. She may have a note for work today. Follow-up in office if cough does not improve.

## 2022-09-21 NOTE — TELEPHONE ENCOUNTER
Patient is calling stating that she is has the cough, sore throat and nasal congestion. Patient stating it has came back and missed work today because of it , No opening and wanted something sent with a work note to cover today.

## 2022-09-23 ENCOUNTER — HOSPITAL ENCOUNTER (OUTPATIENT)
Age: 22
Setting detail: SPECIMEN
Discharge: HOME | End: 2022-09-23

## 2022-09-23 ENCOUNTER — OFFICE VISIT (OUTPATIENT)
Dept: PRIMARY CARE CLINIC | Age: 22
End: 2022-09-23
Payer: COMMERCIAL

## 2022-09-23 VITALS
DIASTOLIC BLOOD PRESSURE: 68 MMHG | WEIGHT: 112 LBS | TEMPERATURE: 97.6 F | HEART RATE: 99 BPM | OXYGEN SATURATION: 97 % | HEIGHT: 64 IN | BODY MASS INDEX: 19.12 KG/M2 | SYSTOLIC BLOOD PRESSURE: 108 MMHG

## 2022-09-23 DIAGNOSIS — R05.9 COUGH: ICD-10-CM

## 2022-09-23 DIAGNOSIS — R52 BODY ACHES: ICD-10-CM

## 2022-09-23 DIAGNOSIS — R05.9 COUGH: Primary | ICD-10-CM

## 2022-09-23 LAB
INFLUENZA A ANTIBODY: NORMAL
INFLUENZA B ANTIBODY: NORMAL

## 2022-09-23 PROCEDURE — G8420 CALC BMI NORM PARAMETERS: HCPCS | Performed by: PHYSICIAN ASSISTANT

## 2022-09-23 PROCEDURE — G8427 DOCREV CUR MEDS BY ELIG CLIN: HCPCS | Performed by: PHYSICIAN ASSISTANT

## 2022-09-23 PROCEDURE — 99213 OFFICE O/P EST LOW 20 MIN: CPT | Performed by: PHYSICIAN ASSISTANT

## 2022-09-23 PROCEDURE — 87804 INFLUENZA ASSAY W/OPTIC: CPT | Performed by: PHYSICIAN ASSISTANT

## 2022-09-23 PROCEDURE — 1036F TOBACCO NON-USER: CPT | Performed by: PHYSICIAN ASSISTANT

## 2022-09-23 RX ORDER — BENZONATATE 100 MG/1
100-200 CAPSULE ORAL 3 TIMES DAILY PRN
Qty: 60 CAPSULE | Refills: 0 | Status: SHIPPED | OUTPATIENT
Start: 2022-09-23 | End: 2022-09-30

## 2022-09-23 ASSESSMENT — ENCOUNTER SYMPTOMS
ABDOMINAL PAIN: 0
EYE PAIN: 0
EYE DISCHARGE: 0
COUGH: 1
DIARRHEA: 0
NAUSEA: 0
SORE THROAT: 1
VOMITING: 0
RHINORRHEA: 1
ABDOMINAL DISTENTION: 0

## 2022-09-23 NOTE — PROGRESS NOTES
[facility]  [address]  Phone: [phone]  Fax: [phone]    Leo Watkins is a 22 y.o. female who presents today for her medical conditions/complaintsas noted below. Chief Complaint   Patient presents with    Cough     Patient said she has a cough that keeps her up all night. Patient said her chest hurts from coughing. Pt stated she was in to see Janki Bowie for this cough and she finished abx         HPI:     Cough  Associated symptoms include headaches, myalgias (generalized), rhinorrhea and a sore throat. Pertinent negatives include no chills, ear pain or fever. Patient here for evaluation of productive cough. She was seen by Malini Heredia PA-C for cough, rhinorrhea, headache 1 month ago. She was treated with Tessalon, azithromycin, and prednisone with relief. Symptoms returned 2 days ago, now with fatigue, cough is worse. Cough keeps her up at night and is her primary complaint. Sputum is clear. At home COVID test was negative. Sister is ill with similar symptoms and her symptoms preceded the patient's. Dayquil, Nyguil, Mucinex with no relief.   NO Hx of Asthma or allergies  Current Outpatient Medications   Medication Sig Dispense Refill    benzonatate (TESSALON) 100 MG capsule Take 1-2 capsules by mouth 3 times daily as needed for Cough 60 capsule 0    guaiFENesin (MUCINEX) 600 MG extended release tablet Take 1 tablet by mouth 2 times daily for 15 days 30 tablet 0    Doxylamine-DM (VICKS DAYQUIL/NYQUIL COUGH PO) Take by mouth      buPROPion (WELLBUTRIN XL) 150 MG extended release tablet Take 1 tablet by mouth every morning 30 tablet 3    midodrine (PROAMATINE) 2.5 MG tablet take 1 tablet by mouth three times a day 90 tablet 1    Norgestim-Eth Estrad Triphasic (TRI-SPRINTEC) 0.18/0.215/0.25 MG-35 MCG TABS Take 1 tablet by mouth daily 28 tablet 3    polyethylene glycol (GLYCOLAX) 17 GM/SCOOP powder take 17GM (DISSOLVED IN WATER) by mouth rate and regular rhythm. Heart sounds: Normal heart sounds. No murmur heard. No friction rub. No gallop. Pulmonary:      Effort: Pulmonary effort is normal. No respiratory distress. Breath sounds: Normal breath sounds. No stridor. No wheezing, rhonchi or rales. Lymphadenopathy:      Cervical: No cervical adenopathy. Skin:     General: Skin is warm. Findings: No rash. Neurological:      General: No focal deficit present. Mental Status: She is alert and oriented to person, place, and time. Mental status is at baseline. Psychiatric:         Mood and Affect: Mood normal.         Behavior: Behavior normal.         Thought Content: Thought content normal.         Judgment: Judgment normal.       Assessment:       Diagnosis Orders   1. Cough  COVID-19    POCT Influenza A/B    benzonatate (TESSALON) 100 MG capsule      2. Body aches  COVID-19    POCT Influenza A/B           Plan:    Discussed likelihood of viral URI  Swab for flu and COVID  Continue Mucinex, added Tessalon  Supportive care  Quarantine discussed if Covid. Return if symptoms worsen or fail to improve. Orders Placed This Encounter   Procedures    COVID-19     Standing Status:   Future     Standing Expiration Date:   9/23/2023     Scheduling Instructions:      1) Due to current limited availability of the COVID-19 test, tests will be prioritized based on responses to questions above. Testing may be delayed due to volume. 2) Print and instruct patient to adhere to CDC home isolation program. (Link Above)              3) Set up or refer patient for a monitoring program.              4) Have patient sign up for and leverage MyChart (if not previously done). Order Specific Question:   Is this test for diagnosis or screening? Answer:   Diagnosis of ill patient     Order Specific Question:   Symptomatic for COVID-19 as defined by CDC?      Answer:   Yes     Order Specific Question:   Date of Symptom Onset Answer:   9/21/2022     Order Specific Question:   Hospitalized for COVID-19? Answer:   No     Order Specific Question:   Admitted to ICU for COVID-19? Answer:   No     Order Specific Question:   Employed in healthcare setting? Answer:   No     Order Specific Question:   Resident in a congregate (group) care setting? Answer:   No     Order Specific Question:   Pregnant? Answer:   Unknown     Order Specific Question:   Previously tested for COVID-19?      Answer:   Yes    POCT Influenza A/B       Orders Placed This Encounter   Medications    benzonatate (TESSALON) 100 MG capsule     Sig: Take 1-2 capsules by mouth 3 times daily as needed for Cough     Dispense:  60 capsule     Refill:  0             Electronically signed by Neeru Scott 9/23/2022 at 2:47 PM

## 2022-09-24 LAB
SARS-COV-2: NORMAL
SARS-COV-2: NOT DETECTED
SOURCE: NORMAL

## 2022-10-27 ENCOUNTER — OFFICE VISIT (OUTPATIENT)
Dept: PRIMARY CARE CLINIC | Age: 22
End: 2022-10-27
Payer: COMMERCIAL

## 2022-10-27 VITALS
SYSTOLIC BLOOD PRESSURE: 118 MMHG | DIASTOLIC BLOOD PRESSURE: 80 MMHG | WEIGHT: 112 LBS | HEIGHT: 64 IN | BODY MASS INDEX: 19.12 KG/M2 | HEART RATE: 78 BPM | OXYGEN SATURATION: 99 %

## 2022-10-27 DIAGNOSIS — G47.00 INSOMNIA, UNSPECIFIED TYPE: ICD-10-CM

## 2022-10-27 DIAGNOSIS — K59.00 CONSTIPATION, UNSPECIFIED CONSTIPATION TYPE: Primary | ICD-10-CM

## 2022-10-27 DIAGNOSIS — Z79.899 MEDICATION MANAGEMENT: ICD-10-CM

## 2022-10-27 PROCEDURE — 99213 OFFICE O/P EST LOW 20 MIN: CPT | Performed by: NURSE PRACTITIONER

## 2022-10-27 PROCEDURE — 1036F TOBACCO NON-USER: CPT | Performed by: NURSE PRACTITIONER

## 2022-10-27 PROCEDURE — G8420 CALC BMI NORM PARAMETERS: HCPCS | Performed by: NURSE PRACTITIONER

## 2022-10-27 PROCEDURE — G8484 FLU IMMUNIZE NO ADMIN: HCPCS | Performed by: NURSE PRACTITIONER

## 2022-10-27 PROCEDURE — G8427 DOCREV CUR MEDS BY ELIG CLIN: HCPCS | Performed by: NURSE PRACTITIONER

## 2022-10-27 RX ORDER — AMOXICILLIN 250 MG
2 CAPSULE ORAL 2 TIMES DAILY
Qty: 120 TABLET | Refills: 1 | Status: SHIPPED | OUTPATIENT
Start: 2022-10-27 | End: 2022-11-26

## 2022-10-27 RX ORDER — TRAZODONE HYDROCHLORIDE 50 MG/1
50 TABLET ORAL NIGHTLY PRN
Qty: 30 TABLET | Refills: 1 | Status: SHIPPED | OUTPATIENT
Start: 2022-10-27

## 2022-10-27 ASSESSMENT — ENCOUNTER SYMPTOMS
WHEEZING: 0
ABDOMINAL PAIN: 0
EYE DISCHARGE: 0
CONSTIPATION: 1
SHORTNESS OF BREATH: 0
RHINORRHEA: 0
DIARRHEA: 0
EYE REDNESS: 0
NAUSEA: 0
COUGH: 0
VOMITING: 0
SORE THROAT: 0

## 2022-10-27 NOTE — PROGRESS NOTES
[address] [address]  Dept: [address] [place] [address]  is a 22 y.o. female Established patient, who presents today for her medical conditions/complaints as noted below. Chief Complaint   Patient presents with    Insomnia     Patient is here today states unable to sleep. Patient states ongoing 1 week. Constipation       Patient is here today c/o constipation and blood in stool ongoing 1-2 weeks        HPI:     HPI  She states that she is still having constipation. The miralax did not work well  She is eating metamucil crackers every day and it help a little. She states she is not stressed. States he anxiety is not high. 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 She typically goes to bed about midnight and falls asleep about 4 am - 5 am.  Sometimes she falls asleep but falls wakes up frequently. Sometimes it is hard to go back to sleep. She is using good sleep hygiene. Chauncey Ramirez works but makes her tired in the morning. Reviewed prior notes: Previous PCP   Reviewed previous:  Labs    No results found for: LDLCHOLESTEROL, LDLCALC    (goal LDL is <100)   AST (U/L)   Date Value   02/20/2021 29     ALT (U/L)   Date Value   02/20/2021 15     BUN (mg/dL)   Date Value   09/01/2021 14     BP Readings from Last 3 Encounters:   10/27/22 118/80   09/23/22 108/68   06/23/22 102/64          (goal 120/80)    History reviewed. No pertinent past medical history. History reviewed. No pertinent surgical history.     Family History   Problem Relation Age of Onset    Diabetes Father     High Blood Pressure Father     Other Father     Cancer Paternal Uncle     Diabetes Paternal Grandmother        Social History     Tobacco Use    Smoking status: Never    Smokeless tobacco: Never   Substance Use Topics    Alcohol use: Never      Current Outpatient Medications   Medication Sig Dispense Refill    senna-docusate (SENNA PLUS) 8.6-50 MG per tablet Take 2 tablets by mouth 2 times daily 120 tablet 1    traZODone (DESYREL) 50 MG tablet Take 1 tablet by mouth nightly as needed for Sleep 30 tablet 1    midodrine (PROAMATINE) 2.5 MG tablet take 1 tablet by mouth three times a day 90 tablet 1    Norgestim-Eth Estrad Triphasic (TRI-SPRINTEC) 0.18/0.215/0.25 MG-35 MCG TABS Take 1 tablet by mouth daily 28 tablet 3     No current facility-administered medications for this visit. No Known Allergies    Health Maintenance   Topic Date Due    HIV screen  Never done    Chlamydia/GC screen  Never done    Hepatitis C screen  Never done    COVID-19 Vaccine (3 - Booster for Pfizer series) 07/11/2021    Flu vaccine (1) 08/01/2022    Depression Monitoring  06/13/2023    DTaP/Tdap/Td vaccine (6 - Td or Tdap) 07/30/2024    Hepatitis A vaccine  Completed    HPV vaccine  Completed    Varicella vaccine  Completed    Meningococcal (ACWY) vaccine  Completed    Hib vaccine  Aged Out    Pneumococcal 0-64 years Vaccine  Aged Out       Subjective:      Review of Systems   Constitutional:  Negative for chills and fever. HENT:  Negative for rhinorrhea and sore throat. Eyes:  Negative for discharge and redness. Respiratory:  Negative for cough, shortness of breath and wheezing. Cardiovascular:  Negative for chest pain and palpitations. Gastrointestinal:  Positive for constipation. Negative for abdominal pain, diarrhea, nausea and vomiting. Genitourinary:  Negative for dysuria and frequency. Musculoskeletal:  Negative for arthralgias and myalgias. Neurological:  Negative for dizziness, light-headedness and headaches. Psychiatric/Behavioral:  Positive for sleep disturbance. Negative for dysphoric mood. The patient is not nervous/anxious. Objective:     /80   Pulse 78   Ht 5' 4\" (1.626 m)   Wt 112 lb (50.8 kg)   SpO2 99%   BMI 19.22 kg/m²   Physical Exam  Vitals and nursing note reviewed.    Constitutional:       General: She is not in acute distress. Appearance: She is well-developed. She is not ill-appearing. HENT:      Head: Normocephalic and atraumatic. Right Ear: External ear normal.      Left Ear: External ear normal.   Eyes:      General: No scleral icterus. Right eye: No discharge. Left eye: No discharge. Conjunctiva/sclera: Conjunctivae normal.   Neck:      Thyroid: No thyromegaly. Trachea: No tracheal deviation. Cardiovascular:      Rate and Rhythm: Normal rate and regular rhythm. Heart sounds: Normal heart sounds. Pulmonary:      Effort: Pulmonary effort is normal. No respiratory distress. Breath sounds: Normal breath sounds. No wheezing. Abdominal:      General: Bowel sounds are normal.      Palpations: Abdomen is soft. Lymphadenopathy:      Cervical: No cervical adenopathy. Skin:     General: Skin is warm. Findings: No rash. Neurological:      Mental Status: She is alert and oriented to person, place, and time. Psychiatric:         Mood and Affect: Mood normal.         Behavior: Behavior normal.         Thought Content: Thought content normal.       Assessment/Plan:   1. Constipation, unspecified constipation type  -     senna-docusate (SENNA PLUS) 8.6-50 MG per tablet; Take 2 tablets by mouth 2 times daily, Disp-120 tablet, R-1Normal  2. Insomnia, unspecified type  -     traZODone (DESYREL) 50 MG tablet; Take 1 tablet by mouth nightly as needed for Sleep, Disp-30 tablet, R-1Normal  3. Medication management  -     traZODone (DESYREL) 50 MG tablet; Take 1 tablet by mouth nightly as needed for Sleep, Disp-30 tablet, R-1Normal     Start Senna/docusate 8.6 mg/50 mg tablet - take 2 tablets in the evening - if still having constipation after a couple days then also take 2 tablets in the morning. May continue to eat metamucil fiber crackers    Use trazodone 50 mg tablet as need for insomnia.   Return in about 6 months (around 4/27/2023) for constipation, insomnia. Data Unavailable     No orders of the defined types were placed in this encounter. Orders Placed This Encounter   Medications    senna-docusate (SENNA PLUS) 8.6-50 MG per tablet     Sig: Take 2 tablets by mouth 2 times daily     Dispense:  120 tablet     Refill:  1    traZODone (DESYREL) 50 MG tablet     Sig: Take 1 tablet by mouth nightly as needed for Sleep     Dispense:  30 tablet     Refill:  1         Patient given educational materials - see patient instructions. Discussed use, benefit, and side effects of prescribed medications. All patient questions answered. Pt voiced understanding. Reviewed health maintenance. Instructed to continue current medications, diet and exercise. Patient agreed with treatment plan. Follow up as directed.      Electronically signed by LYNNETTE Kendrick CNP on 10/27/2022 at 10:50 AM

## 2022-10-27 NOTE — PATIENT INSTRUCTIONS
Start Senna/docusate 8.6 mg/50 mg tablet - take 2 tablets in the evening - if still having constipation after a couple days then also take 2 tablets in the morning. May continue to eat metamucil fiber crackers    Use trazodone 50 mg tablet as need for insomnia.

## 2023-01-06 ENCOUNTER — OFFICE VISIT (OUTPATIENT)
Dept: PRIMARY CARE CLINIC | Age: 23
End: 2023-01-06
Payer: COMMERCIAL

## 2023-01-06 VITALS
BODY MASS INDEX: 19.29 KG/M2 | WEIGHT: 113 LBS | HEART RATE: 105 BPM | OXYGEN SATURATION: 98 % | HEIGHT: 64 IN | DIASTOLIC BLOOD PRESSURE: 60 MMHG | SYSTOLIC BLOOD PRESSURE: 104 MMHG

## 2023-01-06 DIAGNOSIS — R53.83 OTHER FATIGUE: ICD-10-CM

## 2023-01-06 DIAGNOSIS — F41.9 ANXIETY: Primary | ICD-10-CM

## 2023-01-06 PROCEDURE — G8427 DOCREV CUR MEDS BY ELIG CLIN: HCPCS | Performed by: PHYSICIAN ASSISTANT

## 2023-01-06 PROCEDURE — G8484 FLU IMMUNIZE NO ADMIN: HCPCS | Performed by: PHYSICIAN ASSISTANT

## 2023-01-06 PROCEDURE — 1036F TOBACCO NON-USER: CPT | Performed by: PHYSICIAN ASSISTANT

## 2023-01-06 PROCEDURE — G8420 CALC BMI NORM PARAMETERS: HCPCS | Performed by: PHYSICIAN ASSISTANT

## 2023-01-06 PROCEDURE — 99214 OFFICE O/P EST MOD 30 MIN: CPT | Performed by: PHYSICIAN ASSISTANT

## 2023-01-06 RX ORDER — DULOXETIN HYDROCHLORIDE 30 MG/1
30 CAPSULE, DELAYED RELEASE ORAL DAILY
Qty: 30 CAPSULE | Refills: 0 | Status: SHIPPED | OUTPATIENT
Start: 2023-01-06

## 2023-01-06 ASSESSMENT — PATIENT HEALTH QUESTIONNAIRE - PHQ9
5. POOR APPETITE OR OVEREATING: 0
SUM OF ALL RESPONSES TO PHQ QUESTIONS 1-9: 0
2. FEELING DOWN, DEPRESSED OR HOPELESS: 0
SUM OF ALL RESPONSES TO PHQ9 QUESTIONS 1 & 2: 0
4. FEELING TIRED OR HAVING LITTLE ENERGY: 0
8. MOVING OR SPEAKING SO SLOWLY THAT OTHER PEOPLE COULD HAVE NOTICED. OR THE OPPOSITE, BEING SO FIGETY OR RESTLESS THAT YOU HAVE BEEN MOVING AROUND A LOT MORE THAN USUAL: 0
SUM OF ALL RESPONSES TO PHQ QUESTIONS 1-9: 0
SUM OF ALL RESPONSES TO PHQ QUESTIONS 1-9: 0
6. FEELING BAD ABOUT YOURSELF - OR THAT YOU ARE A FAILURE OR HAVE LET YOURSELF OR YOUR FAMILY DOWN: 0
10. IF YOU CHECKED OFF ANY PROBLEMS, HOW DIFFICULT HAVE THESE PROBLEMS MADE IT FOR YOU TO DO YOUR WORK, TAKE CARE OF THINGS AT HOME, OR GET ALONG WITH OTHER PEOPLE: 0
3. TROUBLE FALLING OR STAYING ASLEEP: 0
1. LITTLE INTEREST OR PLEASURE IN DOING THINGS: 0
SUM OF ALL RESPONSES TO PHQ QUESTIONS 1-9: 0
7. TROUBLE CONCENTRATING ON THINGS, SUCH AS READING THE NEWSPAPER OR WATCHING TELEVISION: 0
9. THOUGHTS THAT YOU WOULD BE BETTER OFF DEAD, OR OF HURTING YOURSELF: 0

## 2023-01-06 ASSESSMENT — ENCOUNTER SYMPTOMS
SORE THROAT: 0
RHINORRHEA: 0
CHEST TIGHTNESS: 0
SHORTNESS OF BREATH: 0
SINUS PRESSURE: 0
COUGH: 0

## 2023-01-06 NOTE — PROGRESS NOTES
[address] [address]  Dept: [address] [place] [address]  is a 25 y.o. female Established patient, who presents today for her medical conditions/complaints as noted below. Chief Complaint   Patient presents with    Anxiety     Comes and goes cant handle it     Patient declines vaccines today     Fatigue     Always tired and drained ( patient stating she get 8-9 hours each night )       HPI:     HPI: The patient has stopped wellbutrin only having anxiety not depression. Has been one week. Would like to start something for anxiety. Needs  Note for leaving work early yesterday. Has ob.gyn at [facility]. Reviewed prior notes None  Reviewed previous Labs    No results found for: LDLCHOLESTEROL, LDLCALC    (goal LDL is <100)   AST (U/L)   Date Value   02/20/2021 29     ALT (U/L)   Date Value   02/20/2021 15     BUN (mg/dL)   Date Value   09/01/2021 14     BP Readings from Last 3 Encounters:   01/06/23 104/60   10/27/22 118/80   09/23/22 108/68          (goal 120/80)  No results found for: LABA1C  No past medical history on file. No past surgical history on file.     Family History   Problem Relation Age of Onset    Diabetes Father     High Blood Pressure Father     Other Father     Cancer Paternal Uncle     Diabetes Paternal Grandmother        Social History     Tobacco Use    Smoking status: Never    Smokeless tobacco: Never   Substance Use Topics    Alcohol use: Never      Current Outpatient Medications   Medication Sig Dispense Refill    DULoxetine (CYMBALTA) 30 MG extended release capsule Take 1 capsule by mouth daily 30 capsule 0    traZODone (DESYREL) 50 MG tablet Take 1 tablet by mouth nightly as needed for Sleep 30 tablet 1    midodrine (PROAMATINE) 2.5 MG tablet take 1 tablet by mouth three times a day 90 tablet 1    Norgestim-Eth Estrad Triphasic (TRI-SPRINTEC) 0.18/0.215/0.25 MG-35 MCG TABS Take 1 tablet by mouth daily 28 tablet 3     No current facility-administered medications for this visit. No Known Allergies    Health Maintenance   Topic Date Due    HIV screen  Never done    Chlamydia/GC screen  Never done    Hepatitis C screen  Never done    COVID-19 Vaccine (3 - Booster for Pfizer series) 07/11/2021    Flu vaccine (1) 08/01/2022    Pap smear  Never done    Depression Monitoring  06/13/2023    DTaP/Tdap/Td vaccine (6 - Td or Tdap) 07/30/2024    Hepatitis A vaccine  Completed    HPV vaccine  Completed    Varicella vaccine  Completed    Meningococcal (ACWY) vaccine  Completed    Hib vaccine  Aged Out    Pneumococcal 0-64 years Vaccine  Aged Out       Subjective:      Review of Systems   Constitutional:  Positive for fatigue. Negative for chills, fever and unexpected weight change. HENT:  Negative for congestion, hearing loss, rhinorrhea, sinus pressure and sore throat. Respiratory:  Negative for cough, chest tightness and shortness of breath. Cardiovascular:  Negative for chest pain, palpitations and leg swelling. Musculoskeletal:  Negative for arthralgias, gait problem and myalgias. Skin:  Negative for rash. Allergic/Immunologic: Negative for food allergies. Neurological:  Negative for dizziness, weakness, numbness and headaches. Hematological:  Negative for adenopathy. Psychiatric/Behavioral:  Negative for dysphoric mood and sleep disturbance. The patient is nervous/anxious. Objective:     /60   Pulse (!) 105   Ht 5' 4\" (1.626 m)   Wt 113 lb (51.3 kg)   SpO2 98%   BMI 19.40 kg/m²   Physical Exam  Constitutional:       General: She is not in acute distress. Appearance: Normal appearance. She is not ill-appearing. HENT:      Head: Normocephalic and atraumatic. Right Ear: External ear normal.      Left Ear: External ear normal.      Nose: Nose normal.      Mouth/Throat:      Mouth: Mucous membranes are moist.   Neck:      Vascular: No carotid bruit. Cardiovascular:      Rate and Rhythm: Normal rate and regular rhythm. Pulses: Normal pulses. Heart sounds: Normal heart sounds. Pulmonary:      Effort: Pulmonary effort is normal. No respiratory distress. Breath sounds: Normal breath sounds. Musculoskeletal:         General: Normal range of motion. Cervical back: Normal range of motion and neck supple. Lymphadenopathy:      Cervical: No cervical adenopathy. Skin:     General: Skin is warm and dry. Neurological:      General: No focal deficit present. Mental Status: She is alert and oriented to person, place, and time. Psychiatric:         Mood and Affect: Mood normal.         Behavior: Behavior normal.         Thought Content: Thought content normal.       Assessment and Plan:          1. Anxiety  -     DULoxetine (CYMBALTA) 30 MG extended release capsule; Take 1 capsule by mouth daily, Disp-30 capsule, R-0Normal  2. Other fatigue  -     Vitamin B12; Future  -     Vitamin D 25 Hydroxy; Future  -     Iron and TIBC; Future  -     CBC with Auto Differential; Future  -     TSH With Reflex Ft4; Future  -     HIV Screen; Future  -     Hepatitis C Antibody; Future           Return in about 6 weeks (around 2/17/2023) for med check. Patient given educational materials - see patient instructions. Discussed use, benefit, and side effects of prescribed medications. All patient questions answered. Pt voiced understanding. Reviewed health maintenance. Instructed to continue current medications, diet and exercise. Patient agreed with treatment plan. Follow up as directed.      Electronically signed by LUANA Callahan on 1/6/2023 at 10:58 AM

## 2023-01-26 ENCOUNTER — OFFICE VISIT (OUTPATIENT)
Dept: PRIMARY CARE CLINIC | Age: 23
End: 2023-01-26
Payer: COMMERCIAL

## 2023-01-26 VITALS
BODY MASS INDEX: 19.02 KG/M2 | OXYGEN SATURATION: 98 % | HEART RATE: 105 BPM | DIASTOLIC BLOOD PRESSURE: 74 MMHG | SYSTOLIC BLOOD PRESSURE: 110 MMHG | WEIGHT: 110.8 LBS

## 2023-01-26 DIAGNOSIS — K64.9 HEMORRHOIDS, UNSPECIFIED HEMORRHOID TYPE: Primary | ICD-10-CM

## 2023-01-26 PROCEDURE — G8420 CALC BMI NORM PARAMETERS: HCPCS | Performed by: NURSE PRACTITIONER

## 2023-01-26 PROCEDURE — 1036F TOBACCO NON-USER: CPT | Performed by: NURSE PRACTITIONER

## 2023-01-26 PROCEDURE — G8427 DOCREV CUR MEDS BY ELIG CLIN: HCPCS | Performed by: NURSE PRACTITIONER

## 2023-01-26 PROCEDURE — 99213 OFFICE O/P EST LOW 20 MIN: CPT | Performed by: NURSE PRACTITIONER

## 2023-01-26 PROCEDURE — G8484 FLU IMMUNIZE NO ADMIN: HCPCS | Performed by: NURSE PRACTITIONER

## 2023-01-26 RX ORDER — LIDOCAINE HYDROCHLORIDE AND HYDROCORTISONE ACETATE 30; 25 MG/G; MG/G
1 GEL RECTAL 2 TIMES DAILY
Qty: 1 KIT | Refills: 3 | Status: SHIPPED | OUTPATIENT
Start: 2023-01-26 | End: 2023-02-09

## 2023-01-26 ASSESSMENT — ENCOUNTER SYMPTOMS
HEMATOCHEZIA: 1
NAUSEA: 1
DIARRHEA: 0
ABDOMINAL PAIN: 1
RECTAL PAIN: 0
DIFFICULTY BREATHING: 0
VOMITING: 0

## 2023-01-26 NOTE — PROGRESS NOTES
[address] [address]  Dept: [address] [place] [address]  is a 25 y.o. female Established patient, who presents today for her medical conditions/complaints as noted below. Chief Complaint   Patient presents with    Rectal Bleeding     States it happened once this morning. HPI:     Rectal Bleeding   The current episode started today. The onset was sudden. Episode frequency: one time this morning. The pain is mild. The stool is described as soft. Associated symptoms include abdominal pain and nausea. Pertinent negatives include no anorexia, no fever, no diarrhea, no hemorrhoids, no rectal pain, no vomiting, no vaginal bleeding, no chest pain, no coughing, no difficulty breathing and no rash. She has been Behaving normally. Reviewed prior notes: Previous PCP   Reviewed previous:        No results found for: LDLCHOLESTEROL, LDLCALC    (goal LDL is <100)   AST (U/L)   Date Value   02/20/2021 29     ALT (U/L)   Date Value   02/20/2021 15     BUN (mg/dL)   Date Value   09/01/2021 14     BP Readings from Last 3 Encounters:   01/26/23 110/74   01/06/23 104/60   10/27/22 118/80          (goal 120/80)    No past medical history on file. No past surgical history on file.     Family History   Problem Relation Age of Onset    Diabetes Father     High Blood Pressure Father     Other Father     Cancer Paternal Uncle     Diabetes Paternal Grandmother        Social History     Tobacco Use    Smoking status: Never    Smokeless tobacco: Never   Substance Use Topics    Alcohol use: Never      Current Outpatient Medications   Medication Sig Dispense Refill    Lidocaine-Hydrocortisone Ace 3-2.5 % KIT Place 1 applicator rectally 2 times daily for 14 days 1 kit 3    DULoxetine (CYMBALTA) 30 MG extended release capsule Take 1 capsule by mouth daily 30 capsule 0    traZODone (DESYREL) 50 MG tablet Take 1 tablet by mouth nightly as needed for Sleep 30 tablet 1    midodrine (PROAMATINE) 2.5 MG tablet take 1 tablet by mouth three times a day 90 tablet 1    Norgestim-Eth Estrad Triphasic (TRI-SPRINTEC) 0.18/0.215/0.25 MG-35 MCG TABS Take 1 tablet by mouth daily 28 tablet 3     No current facility-administered medications for this visit. No Known Allergies    Health Maintenance   Topic Date Due    HIV screen  Never done    Chlamydia/GC screen  Never done    Hepatitis C screen  Never done    COVID-19 Vaccine (3 - Booster for Pfizer series) 07/11/2021    Flu vaccine (1) 08/01/2022    Pap smear  Never done    Depression Monitoring  01/06/2024    DTaP/Tdap/Td vaccine (6 - Td or Tdap) 07/30/2024    Hepatitis A vaccine  Completed    HPV vaccine  Completed    Varicella vaccine  Completed    Meningococcal (ACWY) vaccine  Completed    Hib vaccine  Aged Out    Pneumococcal 0-64 years Vaccine  Aged Out       Subjective:      Review of Systems   Constitutional:  Negative for chills, fatigue and fever. Respiratory:  Negative for cough and shortness of breath. Cardiovascular:  Negative for chest pain. Gastrointestinal:  Positive for abdominal pain, hematochezia and nausea. Negative for anorexia, diarrhea, hemorrhoids, rectal pain and vomiting. Genitourinary:  Negative for difficulty urinating, dysuria and vaginal bleeding. Musculoskeletal:  Negative for back pain. Skin:  Negative for rash. Neurological:  Negative for dizziness. Objective:     /74   Pulse (!) 105   Wt 110 lb 12.8 oz (50.3 kg)   SpO2 98%   BMI 19.02 kg/m²   Physical Exam  Vitals and nursing note reviewed. Constitutional:       Appearance: Normal appearance. HENT:      Head: Normocephalic and atraumatic. Cardiovascular:      Rate and Rhythm: Normal rate and regular rhythm. Heart sounds: Normal heart sounds. Pulmonary:      Effort: Pulmonary effort is normal.      Breath sounds: Normal breath sounds.    Abdominal:      General: Bowel sounds are normal. There is no distension. Palpations: Abdomen is soft. Tenderness: There is no abdominal tenderness. Skin:     General: Skin is warm and dry. Neurological:      Mental Status: She is alert and oriented to person, place, and time. Psychiatric:         Mood and Affect: Mood normal.         Behavior: Behavior normal.       Assessment/Plan:   1. Hemorrhoids, unspecified hemorrhoid type  -     Lidocaine-Hydrocortisone Ace 3-2.5 % KIT; Place 1 applicator rectally 2 times daily for 14 days, Disp-1 kit, R-3Normal     Lidocaine-hydrocortisone cream BID    Return if symptoms worsen or fail to improve. Data Unavailable     No orders of the defined types were placed in this encounter. Orders Placed This Encounter   Medications    Lidocaine-Hydrocortisone Ace 3-2.5 % KIT     Sig: Place 1 applicator rectally 2 times daily for 14 days     Dispense:  1 kit     Refill:  3       Patient given educational materials - see patient instructions. Discussed use, benefit, and side effects of prescribed medications. All patient questions answered. Pt voiced understanding. Reviewed health maintenance. Instructed to continue current medications, diet and exercise. Patient agreed with treatment plan. Follow up as directed.      Electronically signed by LYNNETTE Masters CNP on 1/26/2023 at 5:10 PM

## 2023-01-28 ASSESSMENT — ENCOUNTER SYMPTOMS
BACK PAIN: 0
COUGH: 0
SHORTNESS OF BREATH: 0

## 2023-02-15 DIAGNOSIS — G47.00 INSOMNIA, UNSPECIFIED TYPE: ICD-10-CM

## 2023-02-15 DIAGNOSIS — Z79.899 MEDICATION MANAGEMENT: ICD-10-CM

## 2023-02-15 RX ORDER — TRAZODONE HYDROCHLORIDE 50 MG/1
50 TABLET ORAL NIGHTLY PRN
Qty: 30 TABLET | Refills: 1 | Status: SHIPPED | OUTPATIENT
Start: 2023-02-15

## 2023-02-23 DIAGNOSIS — F41.9 ANXIETY: ICD-10-CM

## 2023-02-23 RX ORDER — DULOXETIN HYDROCHLORIDE 30 MG/1
CAPSULE, DELAYED RELEASE ORAL
Qty: 30 CAPSULE | Refills: 2 | Status: SHIPPED | OUTPATIENT
Start: 2023-02-23 | End: 2023-04-18

## 2023-03-01 DIAGNOSIS — Z79.899 MEDICATION MANAGEMENT: ICD-10-CM

## 2023-03-01 DIAGNOSIS — G47.00 INSOMNIA, UNSPECIFIED TYPE: ICD-10-CM

## 2023-03-01 RX ORDER — TRAZODONE HYDROCHLORIDE 50 MG/1
50 TABLET ORAL NIGHTLY PRN
Qty: 30 TABLET | Refills: 1 | OUTPATIENT
Start: 2023-03-01

## 2023-04-18 ENCOUNTER — OFFICE VISIT (OUTPATIENT)
Dept: PRIMARY CARE CLINIC | Age: 23
End: 2023-04-18
Payer: COMMERCIAL

## 2023-04-18 VITALS
HEART RATE: 96 BPM | WEIGHT: 110 LBS | HEIGHT: 64 IN | SYSTOLIC BLOOD PRESSURE: 108 MMHG | DIASTOLIC BLOOD PRESSURE: 70 MMHG | OXYGEN SATURATION: 98 % | BODY MASS INDEX: 18.78 KG/M2

## 2023-04-18 DIAGNOSIS — H61.23 BILATERAL IMPACTED CERUMEN: Primary | ICD-10-CM

## 2023-04-18 DIAGNOSIS — G47.00 INSOMNIA, UNSPECIFIED TYPE: ICD-10-CM

## 2023-04-18 PROCEDURE — 1036F TOBACCO NON-USER: CPT | Performed by: PHYSICIAN ASSISTANT

## 2023-04-18 PROCEDURE — 99213 OFFICE O/P EST LOW 20 MIN: CPT | Performed by: PHYSICIAN ASSISTANT

## 2023-04-18 PROCEDURE — G8420 CALC BMI NORM PARAMETERS: HCPCS | Performed by: PHYSICIAN ASSISTANT

## 2023-04-18 PROCEDURE — G8427 DOCREV CUR MEDS BY ELIG CLIN: HCPCS | Performed by: PHYSICIAN ASSISTANT

## 2023-04-18 RX ORDER — DOCUSATE SODIUM 50 MG AND SENNOSIDES 8.6 MG 8.6; 5 MG/1; MG/1
2 TABLET, FILM COATED ORAL 2 TIMES DAILY
COMMUNITY
Start: 2023-03-06

## 2023-04-18 RX ORDER — RAMELTEON 8 MG/1
8 TABLET ORAL NIGHTLY PRN
Qty: 30 TABLET | Refills: 0 | Status: SHIPPED | OUTPATIENT
Start: 2023-04-18 | End: 2024-04-17

## 2023-04-18 ASSESSMENT — ENCOUNTER SYMPTOMS
RHINORRHEA: 0
SINUS PAIN: 0
SORE THROAT: 0
SINUS PRESSURE: 0

## 2023-04-18 NOTE — PROGRESS NOTES
[facility]  [address]  Phone: [phone]  Fax: [phone]    Diego Nazario is a 25 y.o. female who presents today for her medical conditions/complaintsas noted below. Chief Complaint   Patient presents with    Otalgia     Ear pain in left ear x 1 week,right ear feels full. HPI:     HPI  Not recently sick with cold or allergies. Has troubles with ear wax    Trazodone making her too drowsy even after 9 hours of sleep. Current Outpatient Medications   Medication Sig Dispense Refill    STIMULANT LAXATIVE 8.6-50 MG per tablet Take 2 tablets by mouth 2 times daily      ramelteon (ROZEREM) 8 MG tablet Take 1 tablet by mouth nightly as needed for Sleep 30 tablet 0    midodrine (PROAMATINE) 2.5 MG tablet take 1 tablet by mouth three times a day 90 tablet 1    Norgestim-Eth Estrad Triphasic (TRI-SPRINTEC) 0.18/0.215/0.25 MG-35 MCG TABS Take 1 tablet by mouth daily 28 tablet 3     No current facility-administered medications for this visit. No Known Allergies    Subjective:      Review of Systems   Constitutional:  Negative for chills, diaphoresis and fever. HENT:  Negative for ear discharge, ear pain, rhinorrhea, sinus pressure, sinus pain and sore throat. Psychiatric/Behavioral:  Negative for hallucinations and sleep disturbance. The patient is nervous/anxious. Objective:     /70   Pulse 96   Ht 5' 4\" (1.626 m)   Wt 110 lb (49.9 kg)   SpO2 98%   BMI 18.88 kg/m²   Physical Exam  Vitals and nursing note reviewed. Constitutional:       Appearance: Normal appearance. She is not ill-appearing. HENT:      Right Ear: Tympanic membrane normal. There is impacted cerumen. Left Ear: There is impacted cerumen. Nose: No congestion. Mouth/Throat:      Mouth: Mucous membranes are moist.      Pharynx: No oropharyngeal exudate or posterior oropharyngeal erythema.    Cardiovascular:      Rate and Rhythm:

## 2023-04-27 ENCOUNTER — TELEPHONE (OUTPATIENT)
Dept: PRIMARY CARE CLINIC | Age: 23
End: 2023-04-27

## 2023-04-27 NOTE — TELEPHONE ENCOUNTER
Okay to schedule per [facility]. He asked me to advise patient that if she no shows or late cancels she will not be permitted to reschedule. This is due to multiple no shows and late cancellations at PCP office. Patient voices understanding. Patient scheduled for June 21, 2023 at 2:00 p.m. Due to multiple no shows and late cancellation, I am referring this to Catalino Diaz to schedule as he sees fit.    ----- Message from Azar Zamarripa sent at 4/27/2023  9:51 AM EDT -----  Subject: Message to Provider    QUESTIONS  Information for Provider? PT was referred by Cinthya Juárez to a   Psychologist and PT wants a call back to give her the number so she could   book an appt. Her contact# is [phone]  ---------------------------------------------------------------------------  --------------  Rajani SINGH  [phone]; OK to leave message on voicemail  ---------------------------------------------------------------------------  --------------  SCRIPT ANSWERS  Relationship to Patient?  Self

## 2023-06-21 ENCOUNTER — OFFICE VISIT (OUTPATIENT)
Dept: BEHAVIORAL/MENTAL HEALTH CLINIC | Age: 23
End: 2023-06-21

## 2023-06-21 DIAGNOSIS — F43.23 ADJUSTMENT DISORDER WITH MIXED ANXIETY AND DEPRESSED MOOD: Primary | ICD-10-CM

## 2023-06-21 ASSESSMENT — ANXIETY QUESTIONNAIRES
4. TROUBLE RELAXING: 0
7. FEELING AFRAID AS IF SOMETHING AWFUL MIGHT HAPPEN: 1
2. NOT BEING ABLE TO STOP OR CONTROL WORRYING: 2
3. WORRYING TOO MUCH ABOUT DIFFERENT THINGS: 2
GAD7 TOTAL SCORE: 12
3. WORRYING TOO MUCH ABOUT DIFFERENT THINGS: 1
5. BEING SO RESTLESS THAT IT IS HARD TO SIT STILL: 1
1. FEELING NERVOUS, ANXIOUS, OR ON EDGE: 0
1. FEELING NERVOUS, ANXIOUS, OR ON EDGE: 2
IF YOU CHECKED OFF ANY PROBLEMS ON THIS QUESTIONNAIRE, HOW DIFFICULT HAVE THESE PROBLEMS MADE IT FOR YOU TO DO YOUR WORK, TAKE CARE OF THINGS AT HOME, OR GET ALONG WITH OTHER PEOPLE: SOMEWHAT DIFFICULT
5. BEING SO RESTLESS THAT IT IS HARD TO SIT STILL: 0
GAD7 TOTAL SCORE: 2
2. NOT BEING ABLE TO STOP OR CONTROL WORRYING: 0
IF YOU CHECKED OFF ANY PROBLEMS ON THIS QUESTIONNAIRE, HOW DIFFICULT HAVE THESE PROBLEMS MADE IT FOR YOU TO DO YOUR WORK, TAKE CARE OF THINGS AT HOME, OR GET ALONG WITH OTHER PEOPLE: VERY DIFFICULT
6. BECOMING EASILY ANNOYED OR IRRITABLE: 3
4. TROUBLE RELAXING: 1
7. FEELING AFRAID AS IF SOMETHING AWFUL MIGHT HAPPEN: 0
6. BECOMING EASILY ANNOYED OR IRRITABLE: 1

## 2023-06-21 ASSESSMENT — PATIENT HEALTH QUESTIONNAIRE - PHQ9
SUM OF ALL RESPONSES TO PHQ QUESTIONS 1-9: 9
1. LITTLE INTEREST OR PLEASURE IN DOING THINGS: 1
9. THOUGHTS THAT YOU WOULD BE BETTER OFF DEAD, OR OF HURTING YOURSELF: 0
7. TROUBLE CONCENTRATING ON THINGS, SUCH AS READING THE NEWSPAPER OR WATCHING TELEVISION: 0
6. FEELING BAD ABOUT YOURSELF - OR THAT YOU ARE A FAILURE OR HAVE LET YOURSELF OR YOUR FAMILY DOWN: 3
3. TROUBLE FALLING OR STAYING ASLEEP: 3
2. FEELING DOWN, DEPRESSED OR HOPELESS: 1
SUM OF ALL RESPONSES TO PHQ QUESTIONS 1-9: 9
8. MOVING OR SPEAKING SO SLOWLY THAT OTHER PEOPLE COULD HAVE NOTICED. OR THE OPPOSITE, BEING SO FIGETY OR RESTLESS THAT YOU HAVE BEEN MOVING AROUND A LOT MORE THAN USUAL: 0
SUM OF ALL RESPONSES TO PHQ QUESTIONS 1-9: 9
10. IF YOU CHECKED OFF ANY PROBLEMS, HOW DIFFICULT HAVE THESE PROBLEMS MADE IT FOR YOU TO DO YOUR WORK, TAKE CARE OF THINGS AT HOME, OR GET ALONG WITH OTHER PEOPLE: 2
4. FEELING TIRED OR HAVING LITTLE ENERGY: 1
SUM OF ALL RESPONSES TO PHQ9 QUESTIONS 1 & 2: 2
SUM OF ALL RESPONSES TO PHQ QUESTIONS 1-9: 9
5. POOR APPETITE OR OVEREATING: 0

## 2023-06-27 ENCOUNTER — OFFICE VISIT (OUTPATIENT)
Dept: PRIMARY CARE CLINIC | Age: 23
End: 2023-06-27
Payer: COMMERCIAL

## 2023-06-27 VITALS
WEIGHT: 112 LBS | DIASTOLIC BLOOD PRESSURE: 68 MMHG | HEART RATE: 92 BPM | OXYGEN SATURATION: 97 % | BODY MASS INDEX: 19.12 KG/M2 | HEIGHT: 64 IN | SYSTOLIC BLOOD PRESSURE: 118 MMHG

## 2023-06-27 DIAGNOSIS — K62.5 RECTAL BLEEDING: Primary | ICD-10-CM

## 2023-06-27 DIAGNOSIS — K59.04 CHRONIC IDIOPATHIC CONSTIPATION: ICD-10-CM

## 2023-06-27 PROCEDURE — G8420 CALC BMI NORM PARAMETERS: HCPCS | Performed by: PHYSICIAN ASSISTANT

## 2023-06-27 PROCEDURE — G8427 DOCREV CUR MEDS BY ELIG CLIN: HCPCS | Performed by: PHYSICIAN ASSISTANT

## 2023-06-27 PROCEDURE — 99214 OFFICE O/P EST MOD 30 MIN: CPT | Performed by: PHYSICIAN ASSISTANT

## 2023-06-27 PROCEDURE — 1036F TOBACCO NON-USER: CPT | Performed by: PHYSICIAN ASSISTANT

## 2023-06-27 RX ORDER — HYDROCORTISONE ACETATE 25 MG/1
25 SUPPOSITORY RECTAL 2 TIMES DAILY PRN
Qty: 20 SUPPOSITORY | Refills: 0 | Status: SHIPPED | OUTPATIENT
Start: 2023-06-27

## 2023-06-27 ASSESSMENT — ENCOUNTER SYMPTOMS
DIARRHEA: 0
CONSTIPATION: 1
ANAL BLEEDING: 1
ABDOMINAL PAIN: 1
BLOOD IN STOOL: 1
NAUSEA: 0
VOMITING: 0

## 2023-08-10 ENCOUNTER — OFFICE VISIT (OUTPATIENT)
Dept: PRIMARY CARE CLINIC | Age: 23
End: 2023-08-10
Payer: COMMERCIAL

## 2023-08-10 VITALS
WEIGHT: 113.8 LBS | BODY MASS INDEX: 19.43 KG/M2 | HEIGHT: 64 IN | OXYGEN SATURATION: 98 % | DIASTOLIC BLOOD PRESSURE: 60 MMHG | SYSTOLIC BLOOD PRESSURE: 108 MMHG | HEART RATE: 100 BPM

## 2023-08-10 DIAGNOSIS — G47.00 INSOMNIA, UNSPECIFIED TYPE: ICD-10-CM

## 2023-08-10 DIAGNOSIS — Z79.899 MEDICATION MANAGEMENT: Primary | ICD-10-CM

## 2023-08-10 PROCEDURE — 1036F TOBACCO NON-USER: CPT | Performed by: NURSE PRACTITIONER

## 2023-08-10 PROCEDURE — 99213 OFFICE O/P EST LOW 20 MIN: CPT | Performed by: NURSE PRACTITIONER

## 2023-08-10 PROCEDURE — G8420 CALC BMI NORM PARAMETERS: HCPCS | Performed by: NURSE PRACTITIONER

## 2023-08-10 PROCEDURE — G8427 DOCREV CUR MEDS BY ELIG CLIN: HCPCS | Performed by: NURSE PRACTITIONER

## 2023-08-10 RX ORDER — TEMAZEPAM 15 MG/1
15 CAPSULE ORAL NIGHTLY PRN
Qty: 30 CAPSULE | Refills: 0 | Status: SHIPPED | OUTPATIENT
Start: 2023-08-10 | End: 2023-10-09

## 2023-08-10 RX ORDER — TRAZODONE HYDROCHLORIDE 50 MG/1
50 TABLET ORAL NIGHTLY
COMMUNITY
End: 2023-08-10 | Stop reason: SINTOL

## 2023-08-10 SDOH — ECONOMIC STABILITY: FOOD INSECURITY: WITHIN THE PAST 12 MONTHS, THE FOOD YOU BOUGHT JUST DIDN'T LAST AND YOU DIDN'T HAVE MONEY TO GET MORE.: NEVER TRUE

## 2023-08-10 SDOH — ECONOMIC STABILITY: HOUSING INSECURITY
IN THE LAST 12 MONTHS, WAS THERE A TIME WHEN YOU DID NOT HAVE A STEADY PLACE TO SLEEP OR SLEPT IN A SHELTER (INCLUDING NOW)?: NO

## 2023-08-10 SDOH — ECONOMIC STABILITY: FOOD INSECURITY: WITHIN THE PAST 12 MONTHS, YOU WORRIED THAT YOUR FOOD WOULD RUN OUT BEFORE YOU GOT MONEY TO BUY MORE.: NEVER TRUE

## 2023-08-10 SDOH — ECONOMIC STABILITY: INCOME INSECURITY: HOW HARD IS IT FOR YOU TO PAY FOR THE VERY BASICS LIKE FOOD, HOUSING, MEDICAL CARE, AND HEATING?: NOT HARD AT ALL

## 2023-08-10 ASSESSMENT — ENCOUNTER SYMPTOMS
ABDOMINAL PAIN: 0
SORE THROAT: 0
DIARRHEA: 0
SHORTNESS OF BREATH: 0
COUGH: 0
NAUSEA: 0
WHEEZING: 0
EYE DISCHARGE: 0
VOMITING: 0
EYE REDNESS: 0
RHINORRHEA: 0

## 2023-08-10 NOTE — PROGRESS NOTES
[address] [facility]  [address] [facility] [address]  Dept: [address] is a 25 y.o. female Established patient, who presents today for her medical conditions/complaints as noted below. Chief Complaint   Patient presents with    Medication Check     Trazadone- not feeling well with it       HPI:     HPI   She felt like she can not breath when she takes trazodone  She also feels lethargic in the morning even though she slept 8-9 hours. Without taking anything she gets 6 ours of sleep and it is interrupted. She has trouble falling asleep and also staying asleep    Melatonin does not help. She has tried OTC gummies, Zequil, trazodone and remelteon. Reviewed prior notes: None   Reviewed previous:        No results found for: LDLCHOLESTEROL, LDLCALC    (goal LDL is <100)   AST (U/L)   Date Value   02/20/2021 29     ALT (U/L)   Date Value   02/20/2021 15     BUN (mg/dL)   Date Value   09/01/2021 14     BP Readings from Last 3 Encounters:   08/10/23 108/60   06/27/23 118/68   04/18/23 108/70          (goal 120/80)    No past medical history on file. No past surgical history on file. Family History   Problem Relation Age of Onset    Diabetes Father     High Blood Pressure Father     Other Father     Cancer Paternal Uncle     Diabetes Paternal Grandmother        Social History     Tobacco Use    Smoking status: Never    Smokeless tobacco: Never   Substance Use Topics    Alcohol use: Yes     Alcohol/week: 1.0 standard drink     Types: 1 Drinks containing 0.5 oz of alcohol per week     Comment: socially      Current Outpatient Medications   Medication Sig Dispense Refill    temazepam (RESTORIL) 15 MG capsule Take 1 capsule by mouth nightly as needed for Sleep for up to 60 days.  Max Daily Amount: 15 mg 30 capsule 0    hydrocortisone (ANUSOL-HC) 25 MG suppository Place 1 suppository rectally 2 times daily as needed for Hemorrhoids 20

## 2023-09-01 RX ORDER — DOCUSATE SODIUM 50 MG AND SENNOSIDES 8.6 MG 8.6; 5 MG/1; MG/1
2 TABLET, FILM COATED ORAL 2 TIMES DAILY
Qty: 120 TABLET | Refills: 0 | Status: SHIPPED | OUTPATIENT
Start: 2023-09-01

## 2023-09-05 ENCOUNTER — OFFICE VISIT (OUTPATIENT)
Dept: PRIMARY CARE CLINIC | Age: 23
End: 2023-09-05
Payer: COMMERCIAL

## 2023-09-05 VITALS
OXYGEN SATURATION: 98 % | HEART RATE: 88 BPM | SYSTOLIC BLOOD PRESSURE: 100 MMHG | DIASTOLIC BLOOD PRESSURE: 58 MMHG | WEIGHT: 113.2 LBS | HEIGHT: 64 IN | BODY MASS INDEX: 19.33 KG/M2

## 2023-09-05 DIAGNOSIS — K59.09 CHRONIC CONSTIPATION: Primary | ICD-10-CM

## 2023-09-05 PROCEDURE — 99212 OFFICE O/P EST SF 10 MIN: CPT | Performed by: STUDENT IN AN ORGANIZED HEALTH CARE EDUCATION/TRAINING PROGRAM

## 2023-09-05 ASSESSMENT — ENCOUNTER SYMPTOMS
ABDOMINAL PAIN: 0
DIARRHEA: 0
CONSTIPATION: 1
SHORTNESS OF BREATH: 0
NAUSEA: 0
BLOOD IN STOOL: 0
VOMITING: 0
WHEEZING: 0

## 2023-09-05 NOTE — PROGRESS NOTES
[id number] PRIMARY CARE  [facility] [address]  Dept: 126 Rosas Diaz is a 25 y.o. female Established patient, who presents acutely for her complaints as noted below:    Chief Complaint   Patient presents with    Constipation     Pt is here for constipation for 1x week . Pt has been out of stool  softeners but was able to get them filled after changing her pharmacy. Pt states she can only go regularly when she takes her softeners but she can not go at all when she doesn't have them. Pt states she drinks water and eats regularly         HPI:     As noted above. Patient eats regular meals, consistent timing and content. Reviewed prior notes from PCP. Reviewed previous labs. No results found for: LDLCHOLESTEROL, LDLCALC    (goal LDL is <100)   AST (U/L)   Date Value   02/20/2021 29     ALT (U/L)   Date Value   02/20/2021 15     BUN (mg/dL)   Date Value   09/01/2021 14     BP Readings from Last 3 Encounters:   09/05/23 (!) 100/58   08/10/23 108/60   06/27/23 118/68          (goal 120/80)    No past medical history on file. No past surgical history on file. Family History   Problem Relation Age of Onset    Diabetes Father     High Blood Pressure Father     Other Father     Cancer Paternal Uncle     Diabetes Paternal Grandmother        Social History     Tobacco Use    Smoking status: Never    Smokeless tobacco: Never   Substance Use Topics    Alcohol use: Yes     Alcohol/week: 1.0 standard drink     Types: 1 Drinks containing 0.5 oz of alcohol per week     Comment: socially      Current Outpatient Medications   Medication Sig Dispense Refill    STIMULANT LAXATIVE 8.6-50 MG per tablet Take 2 tablets by mouth 2 times daily 120 tablet 0    temazepam (RESTORIL) 15 MG capsule Take 1 capsule by mouth nightly as needed for Sleep for up to 60 days.  Max Daily Amount: 15 mg 30 capsule 0    midodrine (PROAMATINE) 2.5 MG tablet take 1 tablet by

## 2023-10-05 ENCOUNTER — OFFICE VISIT (OUTPATIENT)
Dept: PRIMARY CARE CLINIC | Age: 23
End: 2023-10-05
Payer: COMMERCIAL

## 2023-10-05 VITALS
HEIGHT: 64 IN | DIASTOLIC BLOOD PRESSURE: 70 MMHG | OXYGEN SATURATION: 99 % | SYSTOLIC BLOOD PRESSURE: 100 MMHG | BODY MASS INDEX: 19.15 KG/M2 | HEART RATE: 83 BPM | WEIGHT: 112.2 LBS

## 2023-10-05 DIAGNOSIS — I95.1 ORTHOSTATIC HYPOTENSION: Primary | ICD-10-CM

## 2023-10-05 PROCEDURE — G8484 FLU IMMUNIZE NO ADMIN: HCPCS | Performed by: STUDENT IN AN ORGANIZED HEALTH CARE EDUCATION/TRAINING PROGRAM

## 2023-10-05 PROCEDURE — G8420 CALC BMI NORM PARAMETERS: HCPCS | Performed by: STUDENT IN AN ORGANIZED HEALTH CARE EDUCATION/TRAINING PROGRAM

## 2023-10-05 PROCEDURE — 99213 OFFICE O/P EST LOW 20 MIN: CPT | Performed by: STUDENT IN AN ORGANIZED HEALTH CARE EDUCATION/TRAINING PROGRAM

## 2023-10-05 PROCEDURE — G8427 DOCREV CUR MEDS BY ELIG CLIN: HCPCS | Performed by: STUDENT IN AN ORGANIZED HEALTH CARE EDUCATION/TRAINING PROGRAM

## 2023-10-05 PROCEDURE — 1036F TOBACCO NON-USER: CPT | Performed by: STUDENT IN AN ORGANIZED HEALTH CARE EDUCATION/TRAINING PROGRAM

## 2023-10-05 ASSESSMENT — ENCOUNTER SYMPTOMS
NAUSEA: 0
ABDOMINAL PAIN: 0
SHORTNESS OF BREATH: 0
VOMITING: 0
DIARRHEA: 0

## 2023-10-05 NOTE — PROGRESS NOTES
[id number] PRIMARY CARE  [address] [facility] [address]  Dept: [address] is a 25 y.o. female Established patient, who presents acutely for her complaints as noted below:    Chief Complaint   Patient presents with    Blood Pressure Check     Pt is here for f/u on her BP, pt BP was low last visit        HPI:     Missed her appointment with her regular PCP. Appears to have been started on midodrine 2.5 mg TID last year, stopped on her own. BP in office is 100/70. Not avoiding salt or drinking excess amounts of water. Reviewed prior notes from PCP. Reviewed previous labs. No results found for: \"LDLCHOLESTEROL\", \"LDLCALC\"    (goal LDL is <100)   AST (U/L)   Date Value   02/20/2021 29     ALT (U/L)   Date Value   02/20/2021 15     BUN (mg/dL)   Date Value   09/01/2021 14     BP Readings from Last 3 Encounters:   10/05/23 100/70   09/05/23 (!) 100/58   08/10/23 108/60          (goal 120/80)    No past medical history on file. No past surgical history on file. Family History   Problem Relation Age of Onset    Diabetes Father     High Blood Pressure Father     Other Father     Cancer Paternal Uncle     Diabetes Paternal Grandmother        Social History     Tobacco Use    Smoking status: Never    Smokeless tobacco: Never   Substance Use Topics    Alcohol use: Yes     Alcohol/week: 1.0 standard drink of alcohol     Types: 1 Drinks containing 0.5 oz of alcohol per week     Comment: socially      Current Outpatient Medications   Medication Sig Dispense Refill    STIMULANT LAXATIVE 8.6-50 MG per tablet Take 2 tablets by mouth 2 times daily 120 tablet 0    temazepam (RESTORIL) 15 MG capsule Take 1 capsule by mouth nightly as needed for Sleep for up to 60 days. Max Daily Amount: 15 mg 30 capsule 0     No current facility-administered medications for this visit.      No Known Allergies    Health Maintenance   Topic Date Due    HIV screen  Never done

## 2023-10-09 ENCOUNTER — OFFICE VISIT (OUTPATIENT)
Dept: GASTROENTEROLOGY | Age: 23
End: 2023-10-09
Payer: COMMERCIAL

## 2023-10-09 VITALS
WEIGHT: 113 LBS | DIASTOLIC BLOOD PRESSURE: 60 MMHG | TEMPERATURE: 97.2 F | SYSTOLIC BLOOD PRESSURE: 101 MMHG | BODY MASS INDEX: 19.4 KG/M2

## 2023-10-09 DIAGNOSIS — K58.1 IRRITABLE BOWEL SYNDROME WITH CONSTIPATION: ICD-10-CM

## 2023-10-09 DIAGNOSIS — K59.09 OTHER CONSTIPATION: Primary | ICD-10-CM

## 2023-10-09 PROCEDURE — G8484 FLU IMMUNIZE NO ADMIN: HCPCS | Performed by: INTERNAL MEDICINE

## 2023-10-09 PROCEDURE — 1036F TOBACCO NON-USER: CPT | Performed by: INTERNAL MEDICINE

## 2023-10-09 PROCEDURE — G8427 DOCREV CUR MEDS BY ELIG CLIN: HCPCS | Performed by: INTERNAL MEDICINE

## 2023-10-09 PROCEDURE — G8420 CALC BMI NORM PARAMETERS: HCPCS | Performed by: INTERNAL MEDICINE

## 2023-10-09 PROCEDURE — 99204 OFFICE O/P NEW MOD 45 MIN: CPT | Performed by: INTERNAL MEDICINE

## 2023-10-09 ASSESSMENT — ENCOUNTER SYMPTOMS
SORE THROAT: 0
TROUBLE SWALLOWING: 0
ABDOMINAL PAIN: 0
VOICE CHANGE: 0
ANAL BLEEDING: 0
CHOKING: 0
RECTAL PAIN: 0
DIARRHEA: 0
NAUSEA: 0
WHEEZING: 0
CONSTIPATION: 1
VOMITING: 0
BLOOD IN STOOL: 0
ABDOMINAL DISTENTION: 0
COUGH: 0
SHORTNESS OF BREATH: 0

## 2023-10-09 NOTE — PROGRESS NOTES
GI NEW PATIENT OFFICE VISIT    INTERVAL HISTORY:   Jackelyn Alyce, [address]  [address] [facility],  [facility]    Chief Complaint   Patient presents with    Constipation     Pt is here today as a NP due to chronic constipation. 1. Other constipation    2. Irritable bowel syndrome with constipation        This patient is seen my office for the first time   she is a pleasant 71-year-old female has history significant for chronic constipation issues  Frequency of stools every other day generally  Not associated with any bleeding  Patient has been complaining of some abdominal pains, off and on cramping  Also complains of abdominal bloating and gas  Has off and on nausea without any sig vomiting    Has no weight loss  Has some anxiety issues    She has been taking some stimulant laxatives with some partial relief of her symptoms    Has no known family history for colon cancer    She denies any significant upper GI issues including any dysphagia nausea vomiting hematemesis significant GERD symptoms    She has no history of smoking alcohol abuse illicit drug usage  HISTORY OF PRESENT ILLNESS: Melissa Arizmendi is a 25 y.o. female with a past history remarkable for , referred for evaluation of   Chief Complaint   Patient presents with    Constipation     Pt is here today as a NP due to chronic constipation. .    Past Medical,Family, and Social History reviewed and does contribute to the patient presenting condition. Patient's PMH/PSH,SH,PSYCH Hx, MEDs, ALLERGIES, and ROS were all reviewed and updated in the appropriate sections. PAST MEDICAL HISTORY:  No past medical history on file. No past surgical history on file.     CURRENT MEDICATIONS:    Current Outpatient Medications:     linaclotide (LINZESS) 145 MCG capsule, Take 1 capsule by mouth every morning (before breakfast), Disp: 30 capsule, Rfl: 3    STIMULANT LAXATIVE 8.6-50 MG per tablet, Take 2 tablets by mouth 2 times daily,

## 2023-11-02 ENCOUNTER — OFFICE VISIT (OUTPATIENT)
Dept: PRIMARY CARE CLINIC | Age: 23
End: 2023-11-02
Payer: COMMERCIAL

## 2023-11-02 VITALS
DIASTOLIC BLOOD PRESSURE: 60 MMHG | HEART RATE: 118 BPM | BODY MASS INDEX: 19.4 KG/M2 | WEIGHT: 113 LBS | SYSTOLIC BLOOD PRESSURE: 110 MMHG | TEMPERATURE: 98.3 F | OXYGEN SATURATION: 97 %

## 2023-11-02 DIAGNOSIS — J40 BRONCHITIS: Primary | ICD-10-CM

## 2023-11-02 PROCEDURE — G8484 FLU IMMUNIZE NO ADMIN: HCPCS | Performed by: NURSE PRACTITIONER

## 2023-11-02 PROCEDURE — 99213 OFFICE O/P EST LOW 20 MIN: CPT | Performed by: NURSE PRACTITIONER

## 2023-11-02 PROCEDURE — 1036F TOBACCO NON-USER: CPT | Performed by: NURSE PRACTITIONER

## 2023-11-02 PROCEDURE — G8427 DOCREV CUR MEDS BY ELIG CLIN: HCPCS | Performed by: NURSE PRACTITIONER

## 2023-11-02 PROCEDURE — G8420 CALC BMI NORM PARAMETERS: HCPCS | Performed by: NURSE PRACTITIONER

## 2023-11-02 RX ORDER — BENZONATATE 200 MG/1
200 CAPSULE ORAL 3 TIMES DAILY PRN
Qty: 30 CAPSULE | Refills: 0 | Status: SHIPPED | OUTPATIENT
Start: 2023-11-02 | End: 2023-11-12

## 2023-11-02 RX ORDER — AZITHROMYCIN 250 MG/1
250 TABLET, FILM COATED ORAL SEE ADMIN INSTRUCTIONS
Qty: 6 TABLET | Refills: 0 | Status: SHIPPED | OUTPATIENT
Start: 2023-11-02 | End: 2023-11-07

## 2023-11-02 ASSESSMENT — ENCOUNTER SYMPTOMS
SORE THROAT: 1
COUGH: 1
SHORTNESS OF BREATH: 1
RHINORRHEA: 1
HEARTBURN: 0
WHEEZING: 1

## 2023-11-02 NOTE — PROGRESS NOTES
continue current medications, diet and exercise. Patient agreed with treatment plan. Follow up as directed.      Electronically signed by LYNNETTE Rodriguez CNP on 11/2/2023 at 5:29 PM

## 2023-12-11 ENCOUNTER — NURSE ONLY (OUTPATIENT)
Dept: PRIMARY CARE CLINIC | Age: 23
End: 2023-12-11
Payer: COMMERCIAL

## 2023-12-11 VITALS — WEIGHT: 116.2 LBS | BODY MASS INDEX: 19.95 KG/M2 | HEART RATE: 98 BPM | OXYGEN SATURATION: 97 %

## 2023-12-11 DIAGNOSIS — R05.1 ACUTE COUGH: Primary | ICD-10-CM

## 2023-12-11 LAB
Lab: ABNORMAL
QC PASS/FAIL: ABNORMAL
SARS-COV-2 RDRP RESP QL NAA+PROBE: POSITIVE

## 2023-12-11 PROCEDURE — 87635 SARS-COV-2 COVID-19 AMP PRB: CPT | Performed by: STUDENT IN AN ORGANIZED HEALTH CARE EDUCATION/TRAINING PROGRAM

## 2023-12-11 NOTE — PROGRESS NOTES
Patient is here today for a POC COVID test.    Results were positive. Patient advised of results and advised of current quarantine protocol as recommended by the CDC. Patient verbalized understanding. Copy of results given to patient.

## 2024-01-22 ENCOUNTER — OFFICE VISIT (OUTPATIENT)
Dept: GASTROENTEROLOGY | Age: 24
End: 2024-01-22
Payer: COMMERCIAL

## 2024-01-22 VITALS
WEIGHT: 113 LBS | DIASTOLIC BLOOD PRESSURE: 66 MMHG | SYSTOLIC BLOOD PRESSURE: 115 MMHG | BODY MASS INDEX: 19.4 KG/M2 | TEMPERATURE: 97.8 F

## 2024-01-22 DIAGNOSIS — K64.8 INTERNAL HEMORRHOIDS WITHOUT COMPLICATION: ICD-10-CM

## 2024-01-22 DIAGNOSIS — K59.09 OTHER CONSTIPATION: ICD-10-CM

## 2024-01-22 DIAGNOSIS — K58.1 IRRITABLE BOWEL SYNDROME WITH CONSTIPATION: Primary | ICD-10-CM

## 2024-01-22 PROCEDURE — G8420 CALC BMI NORM PARAMETERS: HCPCS | Performed by: INTERNAL MEDICINE

## 2024-01-22 PROCEDURE — 1036F TOBACCO NON-USER: CPT | Performed by: INTERNAL MEDICINE

## 2024-01-22 PROCEDURE — G8427 DOCREV CUR MEDS BY ELIG CLIN: HCPCS | Performed by: INTERNAL MEDICINE

## 2024-01-22 PROCEDURE — G8484 FLU IMMUNIZE NO ADMIN: HCPCS | Performed by: INTERNAL MEDICINE

## 2024-01-22 PROCEDURE — 99214 OFFICE O/P EST MOD 30 MIN: CPT | Performed by: INTERNAL MEDICINE

## 2024-01-22 RX ORDER — LINACLOTIDE 290 UG/1
290 CAPSULE, GELATIN COATED ORAL
Qty: 30 CAPSULE | Refills: 2 | Status: SHIPPED | OUTPATIENT
Start: 2024-01-22

## 2024-01-22 ASSESSMENT — ENCOUNTER SYMPTOMS
SHORTNESS OF BREATH: 0
CONSTIPATION: 0
ABDOMINAL DISTENTION: 0
VOICE CHANGE: 0
COUGH: 0
WHEEZING: 0
BLOOD IN STOOL: 0
NAUSEA: 0
SORE THROAT: 0
CHOKING: 0
TROUBLE SWALLOWING: 0
VOMITING: 0
ANAL BLEEDING: 0
DIARRHEA: 0
RECTAL PAIN: 0
ABDOMINAL PAIN: 1

## 2024-01-22 NOTE — PROGRESS NOTES
swallowing and voice change.    Respiratory:  Negative for cough, choking, shortness of breath and wheezing.    Cardiovascular:  Negative for chest pain, palpitations and leg swelling.   Gastrointestinal:  Positive for abdominal pain (about a week). Negative for abdominal distention, anal bleeding, blood in stool, constipation, diarrhea, nausea, rectal pain and vomiting.   Neurological:  Negative for dizziness, weakness, light-headedness, numbness and headaches.   Hematological:  Does not bruise/bleed easily.   Psychiatric/Behavioral:  Negative for confusion and sleep disturbance. The patient is not nervous/anxious.        PHYSICAL EXAMINATION: Vital signs reviewed per the nursing documentation.     /66   Temp 97.8 °F (36.6 °C)   Wt 51.3 kg (113 lb)   BMI 19.40 kg/m²   Body mass index is 19.4 kg/m².   Physical Exam  Nursing note reviewed.   Constitutional:       Appearance: She is well-developed.      Comments: Anxious    HENT:      Head: Normocephalic and atraumatic.   Eyes:      Conjunctiva/sclera: Conjunctivae normal.      Pupils: Pupils are equal, round, and reactive to light.   Cardiovascular:      Heart sounds: Normal heart sounds.   Pulmonary:      Effort: Pulmonary effort is normal.      Breath sounds: Normal breath sounds.   Abdominal:      General: Bowel sounds are normal.      Palpations: Abdomen is soft.      Comments: NON TENDER, NON DISTENTED  LIVER SPLEEN AND HERNIAS ARE NOT  PALPABLE  BOWEL SOUNDS ARE POSITIVE      Musculoskeletal:         General: Normal range of motion.      Cervical back: Normal range of motion and neck supple.   Skin:     General: Skin is warm.   Neurological:      Mental Status: She is alert and oriented to person, place, and time.   Psychiatric:         Behavior: Behavior normal.           LABORATORY DATA: Reviewed  Lab Results   Component Value Date    WBC 5.5 09/01/2021    HGB 14.2 09/01/2021    HCT 41.7 09/01/2021    MCV 91.0 09/01/2021     09/01/2021    NA

## 2024-01-29 PROBLEM — N39.0 URINARY TRACT INFECTION DUE TO STREPTOCOCCUS AGALACTIAE: Status: ACTIVE | Noted: 2024-01-29

## 2024-01-29 PROBLEM — B95.1 URINARY TRACT INFECTION DUE TO STREPTOCOCCUS AGALACTIAE: Status: ACTIVE | Noted: 2024-01-29

## 2024-01-29 PROBLEM — R10.2 SUPRAPUBIC PAIN: Status: ACTIVE | Noted: 2024-01-17

## 2024-01-29 PROBLEM — R82.90 ABNORMAL FINDING IN URINE: Status: ACTIVE | Noted: 2024-01-17

## 2024-03-22 ENCOUNTER — OFFICE VISIT (OUTPATIENT)
Dept: PRIMARY CARE CLINIC | Age: 24
End: 2024-03-22
Payer: COMMERCIAL

## 2024-03-22 VITALS
WEIGHT: 119.6 LBS | DIASTOLIC BLOOD PRESSURE: 70 MMHG | SYSTOLIC BLOOD PRESSURE: 116 MMHG | OXYGEN SATURATION: 98 % | HEIGHT: 64 IN | RESPIRATION RATE: 16 BRPM | BODY MASS INDEX: 20.42 KG/M2 | HEART RATE: 80 BPM

## 2024-03-22 DIAGNOSIS — Z12.4 SCREENING FOR CERVICAL CANCER: ICD-10-CM

## 2024-03-22 DIAGNOSIS — F43.21 GRIEF REACTION: ICD-10-CM

## 2024-03-22 DIAGNOSIS — R53.83 OTHER FATIGUE: ICD-10-CM

## 2024-03-22 DIAGNOSIS — Z11.59 ENCOUNTER FOR HCV SCREENING TEST FOR LOW RISK PATIENT: ICD-10-CM

## 2024-03-22 DIAGNOSIS — K58.1 IRRITABLE BOWEL SYNDROME WITH CONSTIPATION: ICD-10-CM

## 2024-03-22 DIAGNOSIS — G47.00 INSOMNIA, UNSPECIFIED TYPE: Primary | ICD-10-CM

## 2024-03-22 DIAGNOSIS — Z11.4 SCREENING FOR HIV (HUMAN IMMUNODEFICIENCY VIRUS): ICD-10-CM

## 2024-03-22 PROCEDURE — G8420 CALC BMI NORM PARAMETERS: HCPCS | Performed by: PHYSICIAN ASSISTANT

## 2024-03-22 PROCEDURE — G8484 FLU IMMUNIZE NO ADMIN: HCPCS | Performed by: PHYSICIAN ASSISTANT

## 2024-03-22 PROCEDURE — G8427 DOCREV CUR MEDS BY ELIG CLIN: HCPCS | Performed by: PHYSICIAN ASSISTANT

## 2024-03-22 PROCEDURE — 1036F TOBACCO NON-USER: CPT | Performed by: PHYSICIAN ASSISTANT

## 2024-03-22 PROCEDURE — 99214 OFFICE O/P EST MOD 30 MIN: CPT | Performed by: PHYSICIAN ASSISTANT

## 2024-03-22 RX ORDER — AMITRIPTYLINE HYDROCHLORIDE 25 MG/1
25 TABLET, FILM COATED ORAL NIGHTLY
Qty: 30 TABLET | Refills: 0 | Status: SHIPPED | OUTPATIENT
Start: 2024-03-22

## 2024-03-22 RX ORDER — TEMAZEPAM 15 MG/1
15 CAPSULE ORAL NIGHTLY PRN
COMMUNITY
End: 2024-03-22

## 2024-03-22 SDOH — ECONOMIC STABILITY: INCOME INSECURITY: HOW HARD IS IT FOR YOU TO PAY FOR THE VERY BASICS LIKE FOOD, HOUSING, MEDICAL CARE, AND HEATING?: NOT HARD AT ALL

## 2024-03-22 SDOH — ECONOMIC STABILITY: FOOD INSECURITY: WITHIN THE PAST 12 MONTHS, YOU WORRIED THAT YOUR FOOD WOULD RUN OUT BEFORE YOU GOT MONEY TO BUY MORE.: NEVER TRUE

## 2024-03-22 SDOH — ECONOMIC STABILITY: FOOD INSECURITY: WITHIN THE PAST 12 MONTHS, THE FOOD YOU BOUGHT JUST DIDN'T LAST AND YOU DIDN'T HAVE MONEY TO GET MORE.: NEVER TRUE

## 2024-03-22 ASSESSMENT — PATIENT HEALTH QUESTIONNAIRE - PHQ9
1. LITTLE INTEREST OR PLEASURE IN DOING THINGS: SEVERAL DAYS
SUM OF ALL RESPONSES TO PHQ QUESTIONS 1-9: 8
10. IF YOU CHECKED OFF ANY PROBLEMS, HOW DIFFICULT HAVE THESE PROBLEMS MADE IT FOR YOU TO DO YOUR WORK, TAKE CARE OF THINGS AT HOME, OR GET ALONG WITH OTHER PEOPLE: SOMEWHAT DIFFICULT
7. TROUBLE CONCENTRATING ON THINGS, SUCH AS READING THE NEWSPAPER OR WATCHING TELEVISION: NOT AT ALL
5. POOR APPETITE OR OVEREATING: NOT AT ALL
SUM OF ALL RESPONSES TO PHQ QUESTIONS 1-9: 8
6. FEELING BAD ABOUT YOURSELF - OR THAT YOU ARE A FAILURE OR HAVE LET YOURSELF OR YOUR FAMILY DOWN: NOT AT ALL
3. TROUBLE FALLING OR STAYING ASLEEP: NEARLY EVERY DAY
8. MOVING OR SPEAKING SO SLOWLY THAT OTHER PEOPLE COULD HAVE NOTICED. OR THE OPPOSITE, BEING SO FIGETY OR RESTLESS THAT YOU HAVE BEEN MOVING AROUND A LOT MORE THAN USUAL: NOT AT ALL
4. FEELING TIRED OR HAVING LITTLE ENERGY: NEARLY EVERY DAY
SUM OF ALL RESPONSES TO PHQ QUESTIONS 1-9: 8
SUM OF ALL RESPONSES TO PHQ QUESTIONS 1-9: 8
SUM OF ALL RESPONSES TO PHQ9 QUESTIONS 1 & 2: 2
9. THOUGHTS THAT YOU WOULD BE BETTER OFF DEAD, OR OF HURTING YOURSELF: NOT AT ALL
2. FEELING DOWN, DEPRESSED OR HOPELESS: SEVERAL DAYS

## 2024-03-22 ASSESSMENT — ENCOUNTER SYMPTOMS
DIARRHEA: 0
CONSTIPATION: 1
NAUSEA: 0
RHINORRHEA: 0
ABDOMINAL PAIN: 0
COUGH: 0
BACK PAIN: 0
SINUS PAIN: 0
SHORTNESS OF BREATH: 0

## 2024-03-22 NOTE — PROGRESS NOTES
Placed This Encounter   Procedures    TSH With Reflex Ft4     Standing Status:   Future     Standing Expiration Date:   3/22/2025    Iron and TIBC     Standing Status:   Future     Standing Expiration Date:   3/22/2025    Ferritin     Standing Status:   Future     Standing Expiration Date:   3/22/2025    Vitamin B12     Standing Status:   Future     Standing Expiration Date:   3/22/2025    Vitamin D 25 Hydroxy     Standing Status:   Future     Standing Expiration Date:   3/22/2025    HIV Screen     Standing Status:   Future     Standing Expiration Date:   3/22/2025    Hepatitis C Antibody     Standing Status:   Future     Standing Expiration Date:   3/22/2025    CBC     Standing Status:   Future     Standing Expiration Date:   3/22/2025    [facility] ([facility])     Referral Priority:   Routine     Referral Type:   Behavioral Health     Referral Reason:   Specialty Services Required     Requested Specialty:   Psychology     Number of Visits Requested:   1         Patient given educational materials - see patient instructions.  Discussed use, benefit, and side effects of prescribedmedications.  All patient questions answered. Pt voiced understanding. Reviewed health maintenance.  Instructed to continue current medications, diet and exercise.  Patient agreed with treatment plan. Follow up as directed.        Electronically signed by Sushil Rosen PA-C on 3/22/2024 at 8:47 AM

## 2024-03-28 ENCOUNTER — TELEMEDICINE (OUTPATIENT)
Dept: BEHAVIORAL/MENTAL HEALTH CLINIC | Age: 24
End: 2024-03-28

## 2024-03-28 DIAGNOSIS — F43.23 ADJUSTMENT DISORDER WITH MIXED ANXIETY AND DEPRESSED MOOD: Primary | ICD-10-CM

## 2024-03-28 NOTE — PROGRESS NOTES
Attitude was Cooperative and Engageable.  Eye-contact was good.  Speech: rate - WNL, rhythm - WNL, volume - WNL.  Verbalizations were goal directed and disorganized.  Thought processes were intact and goal-oriented without evidence of delusions, hallucinations, obsessions, or tammie; with moderate cognitive distortions.   Associations were characterized by intact cognitive processes.  Patient was oriented to person, place, time, and general circumstances;  recent:  good and remote:  good.  Insight and judgment were estimated to be fair, AEB, a fair  understanding of cyclical maladaptive patterns, and the ability to use insight to inform behavior change.   Attention span and concentration appear within functional limits  Language use and knowledge base appear within functional limits        ASSESSMENT  Romina presented to the appointment today for evaluation and treatment of symptoms of grief, anxiety, and depression. She is currently deemed no risk to herself or others and meets criteria for F43.23 Adjustment disorder with mixed anxiety and depressed mood. Romina was in agreement with recommendations.          3/22/2024     8:31 AM 6/21/2023     2:21 PM 1/6/2023    10:42 AM 6/13/2022     4:06 PM 4/14/2022     3:15 PM 3/25/2021     2:20 PM 2/22/2021     1:03 PM   PHQ Scores   PHQ2 Score 2 2 0 6 0 0 0   PHQ9 Score 8 9 0 15 0 0 0     Interpretation of Total Score Depression Severity: 1-4 = Minimal depression, 5-9 = Mild depression, 10-14 = Moderate depression, 15-19 = Moderately severe depression, 20-27 = Severe depression    How often pt has had thoughts of death or hurting self (if PHQ positive for depression):           6/21/2023     2:48 PM 6/21/2023     2:24 PM   AARON 7 SCORE   AARON-7 Total Score 12 2     Interpretation of AARON-7 score: 5-9 = mild anxiety, 10-14 = moderate anxiety, 15+ = severe anxiety. Recommend referral to behavioral health for scores 10 or greater.      DIAGNOSIS  F43.23 Adjustment disorder with

## 2024-04-10 ENCOUNTER — TELEPHONE (OUTPATIENT)
Dept: GASTROENTEROLOGY | Age: 24
End: 2024-04-10

## 2024-04-18 NOTE — PROGRESS NOTES
[facility]  [address] [place] [address] [place] [address]  Phone: [phone]       Name: Romina Childers  : 2000     Chief Complaint:    Romina Childers is a 23 y.o. year old female who presents today for   Chief Complaint   Patient presents with    Insomnia     Started Amitriptyline last visit with PCP Sushil CRAFT-states is not working at all for her     Anxiety    1 Month Follow-Up    Mouth Lesions       History of Present Illness:    Patient of Sushil Rosen's who I am seeing for follow up due to provider being out of office today.     Insomnia and IBS - about 4 weeks ago, PCP started amitriptyline. She has not noticed any benefit or side effects. States fast-acting medications usually work better for her for sleep. She is on 25 mg of amitriptyline.     L side corner of mouth sore X couple days. Painful and cracks. She is using Vaseline.    Medications:    Outpatient Medications Prior to Visit   Medication Sig Dispense Refill    amitriptyline (ELAVIL) 25 MG tablet Take 1 tablet by mouth nightly 30 tablet 0     No facility-administered medications prior to visit.       Review of Systems:     Review of Systems     Physical Exam:     Vitals:  /78 (Site: Left Upper Arm, Position: Sitting, Cuff Size: Small Adult)   Pulse (!) 119   Ht 1.626 m (5' 4\")   Wt 53.5 kg (118 lb)   LMP  (LMP Unknown)   SpO2 98%   BMI 20.25 kg/m²  Body mass index is 20.25 kg/m².    Physical Exam  Vitals and nursing note reviewed.   Constitutional:       Appearance: Normal appearance.   Cardiovascular:      Rate and Rhythm: Normal rate and regular rhythm.   Pulmonary:      Effort: Pulmonary effort is normal.   Skin:     Comments: Dry cracked skin in left corner of mouth. No infection, does not look like cold sore.    Neurological:      Mental Status: She is alert.   Psychiatric:         Mood and Affect: Mood normal.         Behavior: Behavior normal.         Assessment:      Diagnosis Orders   1.

## 2024-04-19 ENCOUNTER — OFFICE VISIT (OUTPATIENT)
Dept: PRIMARY CARE CLINIC | Age: 24
End: 2024-04-19
Payer: COMMERCIAL

## 2024-04-19 VITALS
OXYGEN SATURATION: 98 % | DIASTOLIC BLOOD PRESSURE: 78 MMHG | WEIGHT: 118 LBS | HEIGHT: 64 IN | HEART RATE: 119 BPM | SYSTOLIC BLOOD PRESSURE: 102 MMHG | BODY MASS INDEX: 20.14 KG/M2

## 2024-04-19 DIAGNOSIS — G47.00 INSOMNIA, UNSPECIFIED TYPE: Primary | ICD-10-CM

## 2024-04-19 DIAGNOSIS — K58.1 IRRITABLE BOWEL SYNDROME WITH CONSTIPATION: ICD-10-CM

## 2024-04-19 PROCEDURE — G8420 CALC BMI NORM PARAMETERS: HCPCS | Performed by: FAMILY MEDICINE

## 2024-04-19 PROCEDURE — G8427 DOCREV CUR MEDS BY ELIG CLIN: HCPCS | Performed by: FAMILY MEDICINE

## 2024-04-19 PROCEDURE — 1036F TOBACCO NON-USER: CPT | Performed by: FAMILY MEDICINE

## 2024-04-19 PROCEDURE — 99214 OFFICE O/P EST MOD 30 MIN: CPT | Performed by: FAMILY MEDICINE

## 2024-04-19 RX ORDER — AMITRIPTYLINE HYDROCHLORIDE 50 MG/1
50 TABLET, FILM COATED ORAL NIGHTLY
Qty: 30 TABLET | Refills: 0 | Status: SHIPPED | OUTPATIENT
Start: 2024-04-19

## 2024-05-14 ENCOUNTER — TELEPHONE (OUTPATIENT)
Dept: PRIMARY CARE CLINIC | Age: 24
End: 2024-05-14

## 2024-05-14 DIAGNOSIS — L70.9 ACNE, UNSPECIFIED ACNE TYPE: Primary | ICD-10-CM

## 2024-05-14 NOTE — TELEPHONE ENCOUNTER
Last appt 04/19/24  Next appt 06/05/24      Pt requesting a referral to Dermatology.    Please advise. Thank you.

## 2024-05-22 ENCOUNTER — OFFICE VISIT (OUTPATIENT)
Dept: FAMILY MEDICINE CLINIC | Age: 24
End: 2024-05-22
Payer: COMMERCIAL

## 2024-05-22 VITALS — TEMPERATURE: 98.6 F | OXYGEN SATURATION: 99 % | HEART RATE: 88 BPM | RESPIRATION RATE: 14 BRPM

## 2024-05-22 DIAGNOSIS — J02.9 SORE THROAT: ICD-10-CM

## 2024-05-22 DIAGNOSIS — J02.9 ACUTE PHARYNGITIS, UNSPECIFIED ETIOLOGY: Primary | ICD-10-CM

## 2024-05-22 LAB
STREP PYOGENES DNA, POC: NEGATIVE
VALID INTERNAL CONTROL, POC: NORMAL

## 2024-05-22 PROCEDURE — G8420 CALC BMI NORM PARAMETERS: HCPCS | Performed by: NURSE PRACTITIONER

## 2024-05-22 PROCEDURE — G8427 DOCREV CUR MEDS BY ELIG CLIN: HCPCS | Performed by: NURSE PRACTITIONER

## 2024-05-22 PROCEDURE — 1036F TOBACCO NON-USER: CPT | Performed by: NURSE PRACTITIONER

## 2024-05-22 PROCEDURE — 99213 OFFICE O/P EST LOW 20 MIN: CPT | Performed by: NURSE PRACTITIONER

## 2024-05-22 PROCEDURE — 87651 STREP A DNA AMP PROBE: CPT | Performed by: NURSE PRACTITIONER

## 2024-05-22 RX ORDER — METHYLPREDNISOLONE 4 MG/1
TABLET ORAL
Qty: 1 KIT | Refills: 0 | Status: SHIPPED | OUTPATIENT
Start: 2024-05-22 | End: 2024-05-28

## 2024-05-22 ASSESSMENT — ENCOUNTER SYMPTOMS
ABDOMINAL PAIN: 0
COUGH: 1
VISUAL CHANGE: 0
CHANGE IN BOWEL HABIT: 0
SORE THROAT: 1
NAUSEA: 0
VOICE CHANGE: 0
SWOLLEN GLANDS: 0
RHINORRHEA: 1
VOMITING: 0
SINUS PAIN: 0
TROUBLE SWALLOWING: 0

## 2024-05-22 NOTE — PROGRESS NOTES
postnasal drip, rhinorrhea and sore throat. Negative for congestion, drooling, ear pain, mouth sores, sinus pain, trouble swallowing and voice change.    Respiratory:  Positive for cough.    Cardiovascular:  Negative for chest pain.   Gastrointestinal:  Negative for abdominal pain, anorexia, change in bowel habit, nausea and vomiting.   Musculoskeletal:  Negative for arthralgias, joint swelling, myalgias and neck pain.   Skin:  Negative for rash.   Neurological:  Negative for vertigo, weakness, numbness and headaches.       Objective:     Physical Exam  Vitals reviewed.   Constitutional:       General: She is not in acute distress.     Appearance: Normal appearance. She is not ill-appearing, toxic-appearing or diaphoretic.   HENT:      Head: Normocephalic.      Right Ear: Tympanic membrane normal.      Left Ear: Tympanic membrane normal.      Nose: Rhinorrhea present. No congestion.      Mouth/Throat:      Mouth: Mucous membranes are moist.      Pharynx: Oropharynx is clear. Posterior oropharyngeal erythema present. No oropharyngeal exudate.   Eyes:      Conjunctiva/sclera: Conjunctivae normal.      Pupils: Pupils are equal, round, and reactive to light.   Cardiovascular:      Rate and Rhythm: Normal rate and regular rhythm.   Pulmonary:      Effort: Pulmonary effort is normal. No respiratory distress.   Skin:     General: Skin is warm and dry.   Neurological:      Mental Status: She is alert.           MEDICAL DECISION MAKING Assessment/Plan:     Romina was seen today for pharyngitis and congestion.    Diagnoses and all orders for this visit:    Acute pharyngitis, unspecified etiology    Sore throat  -     AMB POC STREP GO A DIRECT, DNA PROBE    Other orders  -     methylPREDNISolone (MEDROL DOSEPACK) 4 MG tablet; Take by mouth.        Results for orders placed or performed in visit on 05/22/24   AMB POC STREP GO A DIRECT, DNA PROBE   Result Value Ref Range    Valid Internal Control, POC valid     Strep pyogenes

## 2024-06-05 ENCOUNTER — OFFICE VISIT (OUTPATIENT)
Dept: PRIMARY CARE CLINIC | Age: 24
End: 2024-06-05
Payer: COMMERCIAL

## 2024-06-05 VITALS
BODY MASS INDEX: 20.11 KG/M2 | HEART RATE: 86 BPM | WEIGHT: 117.8 LBS | DIASTOLIC BLOOD PRESSURE: 76 MMHG | SYSTOLIC BLOOD PRESSURE: 120 MMHG | RESPIRATION RATE: 16 BRPM | OXYGEN SATURATION: 99 % | HEIGHT: 64 IN

## 2024-06-05 DIAGNOSIS — F41.8 MIXED ANXIETY AND DEPRESSIVE DISORDER: Primary | ICD-10-CM

## 2024-06-05 DIAGNOSIS — G47.00 INSOMNIA, UNSPECIFIED TYPE: ICD-10-CM

## 2024-06-05 PROCEDURE — G8427 DOCREV CUR MEDS BY ELIG CLIN: HCPCS | Performed by: PHYSICIAN ASSISTANT

## 2024-06-05 PROCEDURE — 99214 OFFICE O/P EST MOD 30 MIN: CPT | Performed by: PHYSICIAN ASSISTANT

## 2024-06-05 PROCEDURE — 1036F TOBACCO NON-USER: CPT | Performed by: PHYSICIAN ASSISTANT

## 2024-06-05 PROCEDURE — G8420 CALC BMI NORM PARAMETERS: HCPCS | Performed by: PHYSICIAN ASSISTANT

## 2024-06-05 RX ORDER — ARIPIPRAZOLE 5 MG/1
5 TABLET ORAL DAILY
Qty: 30 TABLET | Refills: 0 | Status: SHIPPED | OUTPATIENT
Start: 2024-06-05

## 2024-06-05 ASSESSMENT — PATIENT HEALTH QUESTIONNAIRE - PHQ9
SUM OF ALL RESPONSES TO PHQ QUESTIONS 1-9: 11
10. IF YOU CHECKED OFF ANY PROBLEMS, HOW DIFFICULT HAVE THESE PROBLEMS MADE IT FOR YOU TO DO YOUR WORK, TAKE CARE OF THINGS AT HOME, OR GET ALONG WITH OTHER PEOPLE: SOMEWHAT DIFFICULT
SUM OF ALL RESPONSES TO PHQ QUESTIONS 1-9: 11
SUM OF ALL RESPONSES TO PHQ QUESTIONS 1-9: 11
8. MOVING OR SPEAKING SO SLOWLY THAT OTHER PEOPLE COULD HAVE NOTICED. OR THE OPPOSITE, BEING SO FIGETY OR RESTLESS THAT YOU HAVE BEEN MOVING AROUND A LOT MORE THAN USUAL: NEARLY EVERY DAY
9. THOUGHTS THAT YOU WOULD BE BETTER OFF DEAD, OR OF HURTING YOURSELF: NOT AT ALL
6. FEELING BAD ABOUT YOURSELF - OR THAT YOU ARE A FAILURE OR HAVE LET YOURSELF OR YOUR FAMILY DOWN: NOT AT ALL
5. POOR APPETITE OR OVEREATING: NOT AT ALL
SUM OF ALL RESPONSES TO PHQ9 QUESTIONS 1 & 2: 2
4. FEELING TIRED OR HAVING LITTLE ENERGY: NEARLY EVERY DAY
3. TROUBLE FALLING OR STAYING ASLEEP: NEARLY EVERY DAY
2. FEELING DOWN, DEPRESSED OR HOPELESS: SEVERAL DAYS
1. LITTLE INTEREST OR PLEASURE IN DOING THINGS: SEVERAL DAYS
SUM OF ALL RESPONSES TO PHQ QUESTIONS 1-9: 11
7. TROUBLE CONCENTRATING ON THINGS, SUCH AS READING THE NEWSPAPER OR WATCHING TELEVISION: NOT AT ALL

## 2024-06-05 ASSESSMENT — ANXIETY QUESTIONNAIRES
GAD7 TOTAL SCORE: 17
6. BECOMING EASILY ANNOYED OR IRRITABLE: NEARLY EVERY DAY
1. FEELING NERVOUS, ANXIOUS, OR ON EDGE: NEARLY EVERY DAY
5. BEING SO RESTLESS THAT IT IS HARD TO SIT STILL: SEVERAL DAYS
IF YOU CHECKED OFF ANY PROBLEMS ON THIS QUESTIONNAIRE, HOW DIFFICULT HAVE THESE PROBLEMS MADE IT FOR YOU TO DO YOUR WORK, TAKE CARE OF THINGS AT HOME, OR GET ALONG WITH OTHER PEOPLE: VERY DIFFICULT
3. WORRYING TOO MUCH ABOUT DIFFERENT THINGS: NEARLY EVERY DAY
2. NOT BEING ABLE TO STOP OR CONTROL WORRYING: NEARLY EVERY DAY
4. TROUBLE RELAXING: MORE THAN HALF THE DAYS
7. FEELING AFRAID AS IF SOMETHING AWFUL MIGHT HAPPEN: MORE THAN HALF THE DAYS

## 2024-06-05 NOTE — PROGRESS NOTES
about the importance of following with psychiatry.  She is given multiple contact information.  We discussed crisis line and if necessary mental health urgent cares through [facility].  She has failed multiple medications the past.  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  Had a long discussion instructions and side effects.  I also recommend magnesium at nighttime.  She is agreeable to plan.  She will return in 1 month.    Return in about 4 weeks (around 7/3/2024) for medication recheck.    No orders of the defined types were placed in this encounter.        Patient given educational materials - see patient instructions.  Discussed use, benefit, and side effects of prescribedmedications.  All patient questions answered. Pt voiced understanding. Reviewed health maintenance.  Instructed to continue current medications, diet and exercise.  Patient agreed with treatment plan. Follow up as directed.        Electronically signed by Sushil Rosen PA-C on 6/5/2024 at 10:37 AM

## 2024-06-06 ASSESSMENT — ENCOUNTER SYMPTOMS
ABDOMINAL PAIN: 0
VOMITING: 0
DIARRHEA: 0
RHINORRHEA: 0
BACK PAIN: 0
SHORTNESS OF BREATH: 0
SINUS PAIN: 0
CONSTIPATION: 0
NAUSEA: 0
COUGH: 0

## 2024-07-05 ENCOUNTER — OFFICE VISIT (OUTPATIENT)
Dept: PRIMARY CARE CLINIC | Age: 24
End: 2024-07-05
Payer: COMMERCIAL

## 2024-07-05 VITALS
BODY MASS INDEX: 20.08 KG/M2 | HEIGHT: 64 IN | OXYGEN SATURATION: 99 % | SYSTOLIC BLOOD PRESSURE: 106 MMHG | WEIGHT: 117.6 LBS | RESPIRATION RATE: 16 BRPM | HEART RATE: 70 BPM | DIASTOLIC BLOOD PRESSURE: 72 MMHG

## 2024-07-05 DIAGNOSIS — G47.00 INSOMNIA, UNSPECIFIED TYPE: ICD-10-CM

## 2024-07-05 DIAGNOSIS — F41.8 MIXED ANXIETY AND DEPRESSIVE DISORDER: Primary | ICD-10-CM

## 2024-07-05 PROCEDURE — 1036F TOBACCO NON-USER: CPT | Performed by: PHYSICIAN ASSISTANT

## 2024-07-05 PROCEDURE — 99213 OFFICE O/P EST LOW 20 MIN: CPT | Performed by: PHYSICIAN ASSISTANT

## 2024-07-05 PROCEDURE — G8427 DOCREV CUR MEDS BY ELIG CLIN: HCPCS | Performed by: PHYSICIAN ASSISTANT

## 2024-07-05 PROCEDURE — G8420 CALC BMI NORM PARAMETERS: HCPCS | Performed by: PHYSICIAN ASSISTANT

## 2024-07-05 RX ORDER — HYDROXYZINE HYDROCHLORIDE 25 MG/1
25 TABLET, FILM COATED ORAL EVERY 8 HOURS PRN
Qty: 20 TABLET | Refills: 0 | Status: SHIPPED | OUTPATIENT
Start: 2024-07-05 | End: 2024-07-15

## 2024-07-05 ASSESSMENT — PATIENT HEALTH QUESTIONNAIRE - PHQ9
6. FEELING BAD ABOUT YOURSELF - OR THAT YOU ARE A FAILURE OR HAVE LET YOURSELF OR YOUR FAMILY DOWN: NOT AT ALL
7. TROUBLE CONCENTRATING ON THINGS, SUCH AS READING THE NEWSPAPER OR WATCHING TELEVISION: SEVERAL DAYS
2. FEELING DOWN, DEPRESSED OR HOPELESS: SEVERAL DAYS
3. TROUBLE FALLING OR STAYING ASLEEP: MORE THAN HALF THE DAYS
1. LITTLE INTEREST OR PLEASURE IN DOING THINGS: SEVERAL DAYS
9. THOUGHTS THAT YOU WOULD BE BETTER OFF DEAD, OR OF HURTING YOURSELF: NOT AT ALL
5. POOR APPETITE OR OVEREATING: NOT AT ALL
4. FEELING TIRED OR HAVING LITTLE ENERGY: MORE THAN HALF THE DAYS
SUM OF ALL RESPONSES TO PHQ QUESTIONS 1-9: 7
SUM OF ALL RESPONSES TO PHQ9 QUESTIONS 1 & 2: 2
8. MOVING OR SPEAKING SO SLOWLY THAT OTHER PEOPLE COULD HAVE NOTICED. OR THE OPPOSITE, BEING SO FIGETY OR RESTLESS THAT YOU HAVE BEEN MOVING AROUND A LOT MORE THAN USUAL: NOT AT ALL

## 2024-07-05 ASSESSMENT — ENCOUNTER SYMPTOMS
BACK PAIN: 0
DIARRHEA: 0
CONSTIPATION: 0
VOMITING: 0
SHORTNESS OF BREATH: 0
NAUSEA: 0
ABDOMINAL PAIN: 0
RHINORRHEA: 0
COUGH: 0
SINUS PAIN: 0

## 2024-07-05 NOTE — PROGRESS NOTES
mental health concerns including depression, anxiety, insomnia.  Advised on concern for possible history of bipolar disorder.  Strongly advised reaching out to psychiatry.  She has contact information.  Discussed crisis line if needed.  She is can prescription of hydroxyzine to use as needed and primarily for sleep.  Discussed instructions and side effects at this time we will hold off on further daily medication due to concern of side effect and patient wanting to see psychiatry.  She is agreeable to plan.  No SI or HI.  Return in about 3 months (around 10/5/2024) for after psychiatry and blood work.    No orders of the defined types were placed in this encounter.        Patient given educational materials - see patient instructions.  Discussed use, benefit, and side effects of prescribedmedications.  All patient questions answered. Pt voiced understanding. Reviewed health maintenance.  Instructed to continue current medications, diet and exercise.  Patient agreed with treatment plan. Follow up as directed.        Electronically signed by Sushil Rosen PA-C on 7/5/2024 at 12:26 PM

## 2024-07-24 ENCOUNTER — OFFICE VISIT (OUTPATIENT)
Dept: FAMILY MEDICINE CLINIC | Age: 24
End: 2024-07-24
Payer: COMMERCIAL

## 2024-07-24 VITALS
BODY MASS INDEX: 19.91 KG/M2 | RESPIRATION RATE: 14 BRPM | OXYGEN SATURATION: 90 % | SYSTOLIC BLOOD PRESSURE: 102 MMHG | DIASTOLIC BLOOD PRESSURE: 62 MMHG | TEMPERATURE: 98.3 F | WEIGHT: 116 LBS | HEART RATE: 81 BPM

## 2024-07-24 DIAGNOSIS — R10.31 INTERMITTENT RIGHT LOWER QUADRANT ABDOMINAL PAIN: Primary | ICD-10-CM

## 2024-07-24 LAB
BILIRUBIN, POC: NORMAL
BLOOD URINE, POC: NORMAL
CLARITY, POC: CLEAR
COLOR, POC: YELLOW
CONTROL: NORMAL
GLUCOSE URINE, POC: NORMAL
KETONES, POC: NORMAL
LEUKOCYTE EST, POC: NORMAL
NITRITE, POC: NORMAL
PH, POC: 6.5
PREGNANCY TEST URINE, POC: NEGATIVE
PROTEIN, POC: NORMAL
SPECIFIC GRAVITY, POC: 1.02
UROBILINOGEN, POC: NORMAL

## 2024-07-24 PROCEDURE — 81025 URINE PREGNANCY TEST: CPT

## 2024-07-24 PROCEDURE — G8427 DOCREV CUR MEDS BY ELIG CLIN: HCPCS

## 2024-07-24 PROCEDURE — 81003 URINALYSIS AUTO W/O SCOPE: CPT

## 2024-07-24 PROCEDURE — G8420 CALC BMI NORM PARAMETERS: HCPCS

## 2024-07-24 PROCEDURE — 1036F TOBACCO NON-USER: CPT

## 2024-07-24 PROCEDURE — 99214 OFFICE O/P EST MOD 30 MIN: CPT

## 2024-07-24 ASSESSMENT — ENCOUNTER SYMPTOMS
DIARRHEA: 0
VOMITING: 0
BLOOD IN STOOL: 0
NAUSEA: 0
CONSTIPATION: 0
BACK PAIN: 0
ABDOMINAL PAIN: 1
RESPIRATORY NEGATIVE: 1

## 2024-07-24 NOTE — PATIENT INSTRUCTIONS
Will call with results.  Continue with supportive treatment measures.  Use Tylenol or Motrin for pain.  Follow-up with OB/GYN.

## 2024-07-24 NOTE — PROGRESS NOTES
[facility] WALK-IN  [address] [place] [address]    [place] [facility]  [address]  [address]  [facility] [address]  Dept: [phone]    Romina Childers is a 23 y.o. female Established patient, who presents to the walk-in clinic today with conditions/complaints as noted below:    Chief Complaint   Patient presents with    Abdominal Cramping     Right side cramping, sharp pain comes and goes, x 1 month         HPI:     Patient is a 23-year-old female that presents today for abdominal pain. Pertinent PMH includes IBS-C. States that it started approximately one month ago. It's present in the RLQ and intermittent in nature; endorses premenstrual correlation, however it's occurred outside of this circumstance. Her LMP was in the beginning of last month. She is sexually active; denies partner with STI symptoms. Denies vaginal bleeding or abnormal vaginal discharge. Describes sharp quality and rates it 7/10. She's taken Tylenol with significant relief. Denies nausea, vomiting, diarrhea, constipation, hematochezia, or melena. No UTI symptoms or low back pain. No fevers or chills.        Past Medical History:   Diagnosis Date    Anxiety        No current outpatient medications on file.     No current facility-administered medications for this visit.       Allergies   Allergen Reactions    Latex        :     Review of Systems   Constitutional:  Negative for chills and fever.   Respiratory: Negative.     Cardiovascular: Negative.    Gastrointestinal:  Positive for abdominal pain (intermittent, RLQ). Negative for blood in stool, constipation, diarrhea, nausea and vomiting.   Genitourinary:  Positive for pelvic pain (right inguinal). Negative for dysuria, flank pain, frequency, hematuria, menstrual problem, urgency, vaginal bleeding and vaginal discharge.

## 2024-08-07 ENCOUNTER — HOSPITAL ENCOUNTER (OUTPATIENT)
Dept: ULTRASOUND IMAGING | Age: 24
Discharge: HOME | End: 2024-08-09
Payer: COMMERCIAL

## 2024-08-07 DIAGNOSIS — R10.31 INTERMITTENT RIGHT LOWER QUADRANT ABDOMINAL PAIN: ICD-10-CM

## 2024-08-07 PROCEDURE — 76856 US EXAM PELVIC COMPLETE: CPT

## 2024-08-15 ENCOUNTER — HOSPITAL ENCOUNTER (OUTPATIENT)
Age: 24
Setting detail: SPECIMEN
Discharge: HOME | End: 2024-08-15

## 2024-08-15 ENCOUNTER — OFFICE VISIT (OUTPATIENT)
Dept: OBGYN CLINIC | Age: 24
End: 2024-08-15

## 2024-08-15 VITALS
DIASTOLIC BLOOD PRESSURE: 62 MMHG | HEART RATE: 98 BPM | SYSTOLIC BLOOD PRESSURE: 116 MMHG | WEIGHT: 114 LBS | HEIGHT: 64 IN | BODY MASS INDEX: 19.46 KG/M2

## 2024-08-15 DIAGNOSIS — N94.6 DYSMENORRHEA: ICD-10-CM

## 2024-08-15 DIAGNOSIS — L70.9 ACNE, UNSPECIFIED ACNE TYPE: ICD-10-CM

## 2024-08-15 DIAGNOSIS — Z01.419 WELL WOMAN EXAM: Primary | ICD-10-CM

## 2024-08-15 RX ORDER — IBUPROFEN 800 MG/1
800 TABLET ORAL EVERY 8 HOURS PRN
Qty: 60 TABLET | Refills: 0 | Status: SHIPPED | OUTPATIENT
Start: 2024-08-15 | End: 2024-09-14

## 2024-08-15 NOTE — PROGRESS NOTES
History and Physical  [facility]  [facility] [address]  [place], [place]  [address] [phone]   Fax [phone]  Romina Childers  8/15/2024              23 y.o.  Chief Complaint   Patient presents with    Annual Exam       Patient's last menstrual period was 2024.             Primary Care Physician: Sushil Rosen PA-C    The patient was seen and examined. She has no chief complaint today and is here for her annual exam.  Her bowels are regular. There are no voiding complaints. She denies any bloating.  She denies vaginal discharge and was counseled on STD's and the need for barrier contraception.     HPI : Romina Childers is a 23 y.o. female     Annual exam  C/o dysmenorrhea- states she used TXA in the past without relief  States she never took the OCPs in the past- desires ot try relief with motrin first   C/o acne- worsening with menses, has appt with dermatology 10/2024  Reviewed use of OCPs for acne and dysmenorrhea  Will call office if desires to try OCP  Had recent negative pelvic u/s     no Bloating  no Early Satiety  no Unexplained weight change of more than 15 lbs  no  PMB  no  PCB  ________________________________________________________________________  OB History    Para Term  AB Living   0 0 0 0 0 0   SAB IAB Ectopic Molar Multiple Live Births   0 0 0 0 0 0     Past Medical History:   Diagnosis Date    Anxiety                                                                    History reviewed. No pertinent surgical history.  Family History   Problem Relation Age of Onset    Diabetes Father     High Blood Pressure Father     Other Father     Cancer Paternal Uncle     Diabetes Paternal Grandmother      Social History     Socioeconomic History    Marital status: Unknown     Spouse name: Not on file    Number of children: Not on file    Years of education: Not on file    Highest education level: Not on file   Occupational History    Not

## 2024-08-23 LAB — CYTOLOGY REPORT: NORMAL

## 2024-10-07 NOTE — PROGRESS NOTES
Dermatology Patient Note  [facility], [facility] DERMATOLOGY  [address]  [address]  [facility] [address]  Dept: [phone]  Dept Fax: [phone]      VISITDATE: 10/9/2024   REFERRING PROVIDER: Sushil Rosen PA-C Alyssa KAROL Childers is a 23 y.o. female  who presents today in the office for:    New Patient (Patient presents in the office today as a new patient for acne. She is having this on her face, chest, back and shoulders for 6 months. She has tried panoxyl, cerave and humble cream. None of this has helped. )      HISTORY OF PRESENT ILLNESS:  New patient presents to [facility] care. She reports that she has acne on her face, chest, back and shoulders. She notes that she has had acne since she was 19, but it has worsened over the last 6 months. She has had redness around her nose prior to the acne. She has used panoxyl, Cerave, and vanicream for acne without relief. She denies use of oral medications for acne. She notes that she does flare around her menstrual cycle.     MEDICAL PROBLEMS:  Patient Active Problem List    Diagnosis Date Noted    Body aches 09/23/2022     Priority: Medium    Mixed anxiety and depressive disorder 06/05/2024    Urinary tract infection due to Streptococcus agalactiae 01/29/2024    Internal hemorrhoids without complication 01/22/2024    Suprapubic pain 01/17/2024    Abnormal finding in urine 01/17/2024    Other constipation 10/09/2023    Irritable bowel syndrome with constipation 10/09/2023    Bilateral impacted cerumen 04/18/2023    Orthostatic hypotension 07/06/2021    Menorrhagia with regular cycle 07/06/2021    Breast abscess 07/06/2021       CURRENT MEDICATIONS:   Current Outpatient Medications   Medication Sig Dispense Refill    ibuprofen (ADVIL;MOTRIN) 800 MG tablet Take 1 tablet by mouth every 8 hours as needed for Pain Use two day prior and first 3 days of cycle to reduce pain/inflammation 60 tablet 0     No

## 2024-10-09 ENCOUNTER — OFFICE VISIT (OUTPATIENT)
Dept: DERMATOLOGY | Age: 24
End: 2024-10-09
Payer: COMMERCIAL

## 2024-10-09 VITALS
TEMPERATURE: 97.7 F | DIASTOLIC BLOOD PRESSURE: 69 MMHG | WEIGHT: 117 LBS | SYSTOLIC BLOOD PRESSURE: 105 MMHG | BODY MASS INDEX: 20.08 KG/M2 | HEART RATE: 90 BPM | OXYGEN SATURATION: 97 %

## 2024-10-09 DIAGNOSIS — L70.0 ACNE VULGARIS: Primary | ICD-10-CM

## 2024-10-09 DIAGNOSIS — L85.8 KERATOSIS PILARIS: ICD-10-CM

## 2024-10-09 DIAGNOSIS — L21.9 SEBORRHEIC DERMATITIS: ICD-10-CM

## 2024-10-09 PROCEDURE — 99204 OFFICE O/P NEW MOD 45 MIN: CPT | Performed by: DERMATOLOGY

## 2024-10-09 PROCEDURE — G8420 CALC BMI NORM PARAMETERS: HCPCS | Performed by: DERMATOLOGY

## 2024-10-09 PROCEDURE — G8484 FLU IMMUNIZE NO ADMIN: HCPCS | Performed by: DERMATOLOGY

## 2024-10-09 PROCEDURE — G8427 DOCREV CUR MEDS BY ELIG CLIN: HCPCS | Performed by: DERMATOLOGY

## 2024-10-09 PROCEDURE — 1036F TOBACCO NON-USER: CPT | Performed by: DERMATOLOGY

## 2024-10-09 RX ORDER — TRETINOIN 0.25 MG/G
CREAM TOPICAL
Qty: 45 G | Refills: 3 | Status: SHIPPED | OUTPATIENT
Start: 2024-10-09

## 2024-10-09 RX ORDER — CLINDAMYCIN PHOSPHATE 10 UG/ML
LOTION TOPICAL
Qty: 60 ML | Refills: 3 | Status: SHIPPED | OUTPATIENT
Start: 2024-10-09

## 2024-10-09 RX ORDER — SPIRONOLACTONE 50 MG/1
TABLET, FILM COATED ORAL
Qty: 30 TABLET | Refills: 2 | Status: SHIPPED | OUTPATIENT
Start: 2024-10-09

## 2024-10-09 RX ORDER — SULFACETAMIDE SODIUM, SULFUR 100; 50 MG/G; MG/G
EMULSION TOPICAL
Qty: 227 G | Refills: 5 | Status: SHIPPED | OUTPATIENT
Start: 2024-10-09

## 2024-10-09 NOTE — PATIENT INSTRUCTIONS
- start spironolactone: begin with 1/2 tablet x 7 days and increase to 1 tablet daily as tolerated  Discussed spironolactone with patient. The patient was informed of common side effects such as increased urination/urinary frequency, menstrual irregularities with mid-cycle spotting, breast tenderness, decreased libido, fatigue, headache, and dizziness. Patient informed to stop taking medication and to immediately report any new onset muscle cramps or weakness, or palpitations.  Patient informed that pregnancy needs to be avoided while on this medication. Discussed risk of elevated potassium and the need to stay away from potassium supplements while on the medication.  - start sulfur sulfacetamide wash (Avar) daily on face, chest, and back  - start clindamycin lotion daily on face, chest and back  - start tretinoin 0.025% cream nightly: apply a pea-sized amount and rub into a thin layer on the face

## 2024-11-25 ENCOUNTER — OFFICE VISIT (OUTPATIENT)
Dept: PRIMARY CARE CLINIC | Age: 24
End: 2024-11-25
Payer: COMMERCIAL

## 2024-11-25 VITALS
SYSTOLIC BLOOD PRESSURE: 110 MMHG | WEIGHT: 120 LBS | HEART RATE: 92 BPM | DIASTOLIC BLOOD PRESSURE: 72 MMHG | OXYGEN SATURATION: 98 % | HEIGHT: 64 IN | TEMPERATURE: 97.9 F | BODY MASS INDEX: 20.49 KG/M2

## 2024-11-25 DIAGNOSIS — M79.674 GREAT TOE PAIN, RIGHT: Primary | ICD-10-CM

## 2024-11-25 DIAGNOSIS — M79.671 RIGHT FOOT PAIN: ICD-10-CM

## 2024-11-25 DIAGNOSIS — M79.89 SWELLING OF TOE OF RIGHT FOOT: ICD-10-CM

## 2024-11-25 PROCEDURE — G8427 DOCREV CUR MEDS BY ELIG CLIN: HCPCS

## 2024-11-25 PROCEDURE — 99213 OFFICE O/P EST LOW 20 MIN: CPT

## 2024-11-25 PROCEDURE — G8484 FLU IMMUNIZE NO ADMIN: HCPCS

## 2024-11-25 PROCEDURE — 1036F TOBACCO NON-USER: CPT

## 2024-11-25 PROCEDURE — G8420 CALC BMI NORM PARAMETERS: HCPCS

## 2024-11-25 RX ORDER — IBUPROFEN 600 MG/1
600 TABLET, FILM COATED ORAL 3 TIMES DAILY PRN
Qty: 30 TABLET | Refills: 0 | Status: SHIPPED | OUTPATIENT
Start: 2024-11-25

## 2024-11-25 ASSESSMENT — ENCOUNTER SYMPTOMS: COLOR CHANGE: 1

## 2024-11-25 NOTE — PROGRESS NOTES
Romina Childers (:  2000) is a 24 y.o. female,Established patient, here for evaluation of the following chief complaint(s):  Toe Pain (RT- Great toe pain- started yesterday- not sure what happened.)         Assessment & Plan  Great toe pain, right       Orders:    XR FOOT RIGHT (MIN 3 VIEWS); Future    Emmett Syed MD, Orthopaedic Surgery, [place]    ibuprofen (ADVIL;MOTRIN) 600 MG tablet; Take 1 tablet by mouth 3 times daily as needed for Pain    Right foot pain       Orders:    XR FOOT RIGHT (MIN 3 VIEWS); Future    Emmett Syed MD, Orthopaedic Surgery, [place]    ibuprofen (ADVIL;MOTRIN) 600 MG tablet; Take 1 tablet by mouth 3 times daily as needed for Pain    Swelling of toe of right foot       Orders:    Emmett Syed MD, Orthopaedic Surgery, [place]    ibuprofen (ADVIL;MOTRIN) 600 MG tablet; Take 1 tablet by mouth 3 times daily as needed for Pain      Patient placed in post surgical shoe   Advised to wear when baring weight.  Alteration to POC pending xray finding   Encouraged to use motrin prn for pain and swelling     Return if symptoms worsen or fail to improve.       Subjective   Patient presents today with onset of swelling, pain, and bruising to top of right foot.  She is unsure what she did but said she thinks maybe she kicked a door and stubbed her right greater toe at that time?  She rates the pain in the right greater toe an 8/10.  Nothing makes the pain better or worse.  She has taken some Tylenol without improvement.         Review of Systems   Musculoskeletal:  Positive for arthralgias and joint swelling.   Skin:  Positive for color change (bruising).   All other systems reviewed and are negative.         Objective   Physical Exam  Constitutional:       General: She is not in acute distress.     Appearance: She is not ill-appearing or toxic-appearing.   Cardiovascular:      Pulses:           Dorsalis pedis pulses

## 2024-12-05 ENCOUNTER — HOSPITAL ENCOUNTER (OUTPATIENT)
Dept: GENERAL RADIOLOGY | Age: 24
Discharge: HOME | End: 2024-12-07
Payer: COMMERCIAL

## 2024-12-05 ENCOUNTER — OFFICE VISIT (OUTPATIENT)
Dept: FAMILY MEDICINE CLINIC | Age: 24
End: 2024-12-05

## 2024-12-05 ENCOUNTER — HOSPITAL ENCOUNTER (OUTPATIENT)
Age: 24
Discharge: HOME | End: 2024-12-07
Payer: COMMERCIAL

## 2024-12-05 VITALS
DIASTOLIC BLOOD PRESSURE: 70 MMHG | TEMPERATURE: 99.4 F | RESPIRATION RATE: 16 BRPM | HEART RATE: 108 BPM | OXYGEN SATURATION: 97 % | SYSTOLIC BLOOD PRESSURE: 110 MMHG

## 2024-12-05 DIAGNOSIS — R05.1 ACUTE COUGH: ICD-10-CM

## 2024-12-05 DIAGNOSIS — R05.1 ACUTE COUGH: Primary | ICD-10-CM

## 2024-12-05 DIAGNOSIS — J02.9 SORE THROAT: ICD-10-CM

## 2024-12-05 LAB
INFLUENZA A ANTIGEN, POC: NEGATIVE
INFLUENZA B ANTIGEN, POC: NEGATIVE
Lab: NORMAL
QC PASS/FAIL: NORMAL
SARS-COV-2 RDRP RESP QL NAA+PROBE: NEGATIVE
STREP PYOGENES DNA, POC: NEGATIVE
VALID INTERNAL CONTROL, POC: NORMAL
VALID INTERNAL CONTROL, POC: NORMAL

## 2024-12-05 PROCEDURE — 71046 X-RAY EXAM CHEST 2 VIEWS: CPT

## 2024-12-05 RX ORDER — GUAIFENESIN 600 MG/1
600 TABLET, EXTENDED RELEASE ORAL 2 TIMES DAILY
Qty: 30 TABLET | Refills: 0 | Status: SHIPPED | OUTPATIENT
Start: 2024-12-05 | End: 2024-12-20

## 2024-12-05 ASSESSMENT — ENCOUNTER SYMPTOMS
WHEEZING: 0
COUGH: 1
SORE THROAT: 1

## 2024-12-05 NOTE — PROGRESS NOTES
on file.    Orders Placed This Encounter   Medications    guaiFENesin (MUCINEX) 600 MG extended release tablet     Sig: Take 1 tablet by mouth 2 times daily for 15 days     Dispense:  30 tablet     Refill:  0             Patient and/or parent given educational materials - see patient instructions.  Discussed use, benefit, and side effects of prescribed medications.  All patient questions answered.  Patient and/or parent voiced understanding.      Electronically signed by LYNNETTE Cobian 12/5/2024 at 2:18 PM

## 2024-12-23 ENCOUNTER — OFFICE VISIT (OUTPATIENT)
Dept: OBGYN CLINIC | Age: 24
End: 2024-12-23
Payer: COMMERCIAL

## 2024-12-23 VITALS
SYSTOLIC BLOOD PRESSURE: 106 MMHG | BODY MASS INDEX: 21.9 KG/M2 | HEIGHT: 62 IN | DIASTOLIC BLOOD PRESSURE: 64 MMHG | WEIGHT: 119 LBS

## 2024-12-23 DIAGNOSIS — R59.9 SWOLLEN LYMPH NODES: Primary | ICD-10-CM

## 2024-12-23 PROBLEM — N61.1 BREAST ABSCESS: Status: RESOLVED | Noted: 2021-07-06 | Resolved: 2024-12-23

## 2024-12-23 PROCEDURE — 1036F TOBACCO NON-USER: CPT | Performed by: NURSE PRACTITIONER

## 2024-12-23 PROCEDURE — G8484 FLU IMMUNIZE NO ADMIN: HCPCS | Performed by: NURSE PRACTITIONER

## 2024-12-23 PROCEDURE — G8420 CALC BMI NORM PARAMETERS: HCPCS | Performed by: NURSE PRACTITIONER

## 2024-12-23 PROCEDURE — G8427 DOCREV CUR MEDS BY ELIG CLIN: HCPCS | Performed by: NURSE PRACTITIONER

## 2024-12-23 PROCEDURE — 99213 OFFICE O/P EST LOW 20 MIN: CPT | Performed by: NURSE PRACTITIONER

## 2024-12-23 RX ORDER — CEPHALEXIN 500 MG/1
500 CAPSULE ORAL 4 TIMES DAILY
Qty: 40 CAPSULE | Refills: 0 | Status: SHIPPED | OUTPATIENT
Start: 2024-12-23 | End: 2025-01-02

## 2024-12-23 NOTE — PROGRESS NOTES
Romina Childers  2024    YOB: 2000          The patient was seen today. She is here regarding lump in groin x 1.5 weeks. States it was painful at first- no longer painful. Concerned for ingrown hair. Her bowels are regular and she is voiding without difficulty. Denies vaginal sx's- declined cultures     HPI:  Romina Childers is a 24 y.o. female  swollen lymph node      OB History    Para Term  AB Living   0 0 0 0 0 0   SAB IAB Ectopic Molar Multiple Live Births   0 0 0 0 0 0       Past Medical History:   Diagnosis Date    Anxiety        History reviewed. No pertinent surgical history.    Family History   Problem Relation Age of Onset    Diabetes Father     High Blood Pressure Father     Other Father     Cancer Paternal Uncle     Diabetes Paternal Grandmother        Social History     Socioeconomic History    Marital status: Unknown     Spouse name: Not on file    Number of children: Not on file    Years of education: Not on file    Highest education level: Not on file   Occupational History    Not on file   Tobacco Use    Smoking status: Never    Smokeless tobacco: Never   Vaping Use    Vaping status: Every Day    Substances: Nicotine    Devices: Disposable   Substance and Sexual Activity    Alcohol use: Yes     Comment: I drink on the a Saturday thats it    Drug use: Never    Sexual activity: Not Currently     Partners: Male   Other Topics Concern    Not on file   Social History Narrative    Not on file     Social Determinants of Health     Financial Resource Strain: Low Risk  (3/22/2024)    Overall Financial Resource Strain (CARDIA)     Difficulty of Paying Living Expenses: Not hard at all   Food Insecurity: No Food Insecurity (3/22/2024)    Hunger Vital Sign     Worried About Running Out of Food in the Last Year: Never true     Ran Out of Food in the Last Year: Never true   Transportation Needs: Unknown (3/22/2024)    PRAPARE - Transportation     Lack of

## 2025-01-14 ENCOUNTER — OFFICE VISIT (OUTPATIENT)
Dept: FAMILY MEDICINE CLINIC | Age: 25
End: 2025-01-14
Payer: COMMERCIAL

## 2025-01-14 VITALS
BODY MASS INDEX: 22.13 KG/M2 | TEMPERATURE: 98.6 F | DIASTOLIC BLOOD PRESSURE: 82 MMHG | RESPIRATION RATE: 12 BRPM | SYSTOLIC BLOOD PRESSURE: 122 MMHG | WEIGHT: 121 LBS | OXYGEN SATURATION: 97 % | HEART RATE: 94 BPM

## 2025-01-14 DIAGNOSIS — J06.9 URI WITH COUGH AND CONGESTION: Primary | ICD-10-CM

## 2025-01-14 DIAGNOSIS — J02.9 SORE THROAT: ICD-10-CM

## 2025-01-14 LAB
STREP PYOGENES DNA, POC: NEGATIVE
VALID INTERNAL CONTROL, POC: NORMAL

## 2025-01-14 PROCEDURE — G8420 CALC BMI NORM PARAMETERS: HCPCS | Performed by: NURSE PRACTITIONER

## 2025-01-14 PROCEDURE — 87651 STREP A DNA AMP PROBE: CPT | Performed by: NURSE PRACTITIONER

## 2025-01-14 PROCEDURE — 99213 OFFICE O/P EST LOW 20 MIN: CPT | Performed by: NURSE PRACTITIONER

## 2025-01-14 PROCEDURE — G8427 DOCREV CUR MEDS BY ELIG CLIN: HCPCS | Performed by: NURSE PRACTITIONER

## 2025-01-14 PROCEDURE — 1036F TOBACCO NON-USER: CPT | Performed by: NURSE PRACTITIONER

## 2025-01-14 RX ORDER — PREDNISONE 20 MG/1
20 TABLET ORAL 2 TIMES DAILY
Qty: 10 TABLET | Refills: 0 | Status: SHIPPED | OUTPATIENT
Start: 2025-01-14 | End: 2025-01-19

## 2025-01-14 NOTE — PROGRESS NOTES
[facility] PHYSICIANS [facility] WALK-IN  [address]  [address]  [facility] [address]  Dept: [phone]  Dept Fax: [phone]    Romina Childers is a 24 y.o. female who presents today for her medical conditions/complaints of   Chief Complaint   Patient presents with    Cough     X 1 week. Non productive cough. Chest congestion.    Pharyngitis     Scratchy throat.           HPI:     /82   Pulse 94   Temp 98.6 °F (37 °C)   Resp 12   Wt 54.9 kg (121 lb)   LMP 12/16/2024 (Exact Date)   SpO2 97%   BMI 22.13 kg/m²       HPI  Pt presented to the clinic today with c/o congestion. This is a new problem. The current episode started 7 days ago. Associated symptoms include: sore throat, dry cough, post nasal drip .  Pertinent negatives include: No fever, chills, ear pain, trouble swallowing.        Past Medical History:   Diagnosis Date    Anxiety         No past surgical history on file.    Family History   Problem Relation Age of Onset    Diabetes Father     High Blood Pressure Father     Other Father     Cancer Paternal Uncle     Diabetes Paternal Grandmother        Social History     Tobacco Use    Smoking status: Never    Smokeless tobacco: Never   Substance Use Topics    Alcohol use: Yes     Comment: I drink on the a Saturday thats it        Prior to Visit Medications    Medication Sig Taking? Authorizing Provider   predniSONE (DELTASONE) 20 MG tablet Take 1 tablet by mouth 2 times daily for 5 days Yes Viola Hernandez APRN - CNP   ibuprofen (ADVIL;MOTRIN) 600 MG tablet Take 1 tablet by mouth 3 times daily as needed for Pain Yes Lexy Patiño APRN - CNP   Sulfacetamide Sodium-Sulfur (AVAR CLEANSER) 10-5 % LIQD ex cleanser Wash face, chest, and back daily Yes Lucero Loza MD   tretinoin (RETIN-A) 0.025 % cream Apply pea sized amount to face nightly Yes Lucero Loza MD       Allergies   Allergen Reactions    Latex          Subjective:

## 2025-01-22 ENCOUNTER — OFFICE VISIT (OUTPATIENT)
Dept: PRIMARY CARE CLINIC | Age: 25
End: 2025-01-22
Payer: COMMERCIAL

## 2025-01-22 VITALS
HEART RATE: 102 BPM | RESPIRATION RATE: 16 BRPM | WEIGHT: 123 LBS | HEIGHT: 60 IN | BODY MASS INDEX: 24.15 KG/M2 | DIASTOLIC BLOOD PRESSURE: 75 MMHG | SYSTOLIC BLOOD PRESSURE: 125 MMHG | OXYGEN SATURATION: 98 %

## 2025-01-22 DIAGNOSIS — R05.2 SUBACUTE COUGH: ICD-10-CM

## 2025-01-22 DIAGNOSIS — F41.8 MIXED ANXIETY AND DEPRESSIVE DISORDER: ICD-10-CM

## 2025-01-22 DIAGNOSIS — J02.9 PHARYNGITIS, UNSPECIFIED ETIOLOGY: Primary | ICD-10-CM

## 2025-01-22 PROCEDURE — G8427 DOCREV CUR MEDS BY ELIG CLIN: HCPCS | Performed by: PHYSICIAN ASSISTANT

## 2025-01-22 PROCEDURE — 99214 OFFICE O/P EST MOD 30 MIN: CPT | Performed by: PHYSICIAN ASSISTANT

## 2025-01-22 PROCEDURE — 1036F TOBACCO NON-USER: CPT | Performed by: PHYSICIAN ASSISTANT

## 2025-01-22 PROCEDURE — G8420 CALC BMI NORM PARAMETERS: HCPCS | Performed by: PHYSICIAN ASSISTANT

## 2025-01-22 RX ORDER — FLUTICASONE PROPIONATE 50 MCG
2 SPRAY, SUSPENSION (ML) NASAL DAILY
Qty: 16 G | Refills: 0 | Status: SHIPPED | OUTPATIENT
Start: 2025-01-22

## 2025-01-22 RX ORDER — HYDROXYZINE HYDROCHLORIDE 25 MG/1
25 TABLET, FILM COATED ORAL EVERY 8 HOURS PRN
Qty: 30 TABLET | Refills: 0 | Status: SHIPPED | OUTPATIENT
Start: 2025-01-22 | End: 2025-02-21

## 2025-01-22 RX ORDER — LORATADINE 10 MG/1
10 TABLET ORAL DAILY
Qty: 30 TABLET | Refills: 0 | Status: SHIPPED | OUTPATIENT
Start: 2025-01-22

## 2025-01-22 SDOH — ECONOMIC STABILITY: FOOD INSECURITY: WITHIN THE PAST 12 MONTHS, YOU WORRIED THAT YOUR FOOD WOULD RUN OUT BEFORE YOU GOT MONEY TO BUY MORE.: NEVER TRUE

## 2025-01-22 SDOH — ECONOMIC STABILITY: FOOD INSECURITY: WITHIN THE PAST 12 MONTHS, THE FOOD YOU BOUGHT JUST DIDN'T LAST AND YOU DIDN'T HAVE MONEY TO GET MORE.: NEVER TRUE

## 2025-01-22 ASSESSMENT — PATIENT HEALTH QUESTIONNAIRE - PHQ9
1. LITTLE INTEREST OR PLEASURE IN DOING THINGS: NOT AT ALL
SUM OF ALL RESPONSES TO PHQ9 QUESTIONS 1 & 2: 0
9. THOUGHTS THAT YOU WOULD BE BETTER OFF DEAD, OR OF HURTING YOURSELF: NOT AT ALL
6. FEELING BAD ABOUT YOURSELF - OR THAT YOU ARE A FAILURE OR HAVE LET YOURSELF OR YOUR FAMILY DOWN: NOT AT ALL
SUM OF ALL RESPONSES TO PHQ QUESTIONS 1-9: 2
SUM OF ALL RESPONSES TO PHQ QUESTIONS 1-9: 2
2. FEELING DOWN, DEPRESSED OR HOPELESS: NOT AT ALL
SUM OF ALL RESPONSES TO PHQ QUESTIONS 1-9: 2
4. FEELING TIRED OR HAVING LITTLE ENERGY: SEVERAL DAYS
8. MOVING OR SPEAKING SO SLOWLY THAT OTHER PEOPLE COULD HAVE NOTICED. OR THE OPPOSITE, BEING SO FIGETY OR RESTLESS THAT YOU HAVE BEEN MOVING AROUND A LOT MORE THAN USUAL: NOT AT ALL
10. IF YOU CHECKED OFF ANY PROBLEMS, HOW DIFFICULT HAVE THESE PROBLEMS MADE IT FOR YOU TO DO YOUR WORK, TAKE CARE OF THINGS AT HOME, OR GET ALONG WITH OTHER PEOPLE: NOT DIFFICULT AT ALL
5. POOR APPETITE OR OVEREATING: NOT AT ALL
SUM OF ALL RESPONSES TO PHQ QUESTIONS 1-9: 2
7. TROUBLE CONCENTRATING ON THINGS, SUCH AS READING THE NEWSPAPER OR WATCHING TELEVISION: NOT AT ALL
3. TROUBLE FALLING OR STAYING ASLEEP: SEVERAL DAYS

## 2025-01-22 NOTE — PROGRESS NOTES
leg swelling.   Gastrointestinal:  Negative for abdominal pain, constipation, diarrhea, nausea and vomiting.   Genitourinary:  Negative for difficulty urinating, frequency and urgency.   Musculoskeletal:  Negative for arthralgias, back pain and myalgias.   Neurological:  Negative for dizziness and headaches.   Psychiatric/Behavioral:  Negative for confusion, dysphoric mood and sleep disturbance. The patient is nervous/anxious.    All other systems reviewed and are negative.      Objective:     Physical Exam  Vitals and nursing note reviewed.   Constitutional:       General: She is not in acute distress.     Appearance: Normal appearance.   HENT:      Head: Normocephalic.      Right Ear: Tympanic membrane, ear canal and external ear normal.      Left Ear: Tympanic membrane, ear canal and external ear normal.      Mouth/Throat:      Mouth: Mucous membranes are moist.      Pharynx: Oropharynx is clear. No pharyngeal swelling, oropharyngeal exudate, posterior oropharyngeal erythema, uvula swelling or postnasal drip.      Tonsils: No tonsillar exudate.   Eyes:      Extraocular Movements: Extraocular movements intact.      Conjunctiva/sclera: Conjunctivae normal.      Pupils: Pupils are equal, round, and reactive to light.   Cardiovascular:      Rate and Rhythm: Normal rate and regular rhythm.      Pulses: Normal pulses.      Heart sounds: Normal heart sounds.   Pulmonary:      Effort: Pulmonary effort is normal.      Breath sounds: Normal breath sounds.   Abdominal:      General: Abdomen is flat. Bowel sounds are normal.      Palpations: Abdomen is soft.      Tenderness: There is no abdominal tenderness.   Musculoskeletal:      Cervical back: Normal range of motion.      Right lower leg: No edema.      Left lower leg: No edema.   Lymphadenopathy:      Cervical: No cervical adenopathy.   Skin:     General: Skin is warm.      Capillary Refill: Capillary refill takes less than 2 seconds.   Neurological:      General: No

## 2025-01-23 ASSESSMENT — ENCOUNTER SYMPTOMS
NAUSEA: 0
SINUS PAIN: 0
COUGH: 1
BACK PAIN: 0
CONSTIPATION: 0
RHINORRHEA: 0
VOMITING: 0
DIARRHEA: 0
SHORTNESS OF BREATH: 0
SORE THROAT: 1
ABDOMINAL PAIN: 0

## 2025-01-29 ENCOUNTER — TELEPHONE (OUTPATIENT)
Dept: OBGYN CLINIC | Age: 25
End: 2025-01-29

## 2025-01-29 DIAGNOSIS — N92.6 MISSED MENSES: Primary | ICD-10-CM

## 2025-01-29 NOTE — TELEPHONE ENCOUNTER
Pt messaged in and she has had a missed cycle and sore breast , pt is asking for a blood pregnancy test , Quant ordered

## 2025-01-31 ENCOUNTER — PATIENT MESSAGE (OUTPATIENT)
Dept: PRIMARY CARE CLINIC | Age: 25
End: 2025-01-31

## 2025-01-31 DIAGNOSIS — R13.10 DYSPHAGIA, UNSPECIFIED TYPE: ICD-10-CM

## 2025-01-31 DIAGNOSIS — J02.9 PHARYNGITIS, UNSPECIFIED ETIOLOGY: Primary | ICD-10-CM

## 2025-02-05 ENCOUNTER — OFFICE VISIT (OUTPATIENT)
Dept: OBGYN CLINIC | Age: 25
End: 2025-02-05
Payer: COMMERCIAL

## 2025-02-05 VITALS
BODY MASS INDEX: 22.63 KG/M2 | SYSTOLIC BLOOD PRESSURE: 114 MMHG | HEIGHT: 62 IN | HEART RATE: 78 BPM | DIASTOLIC BLOOD PRESSURE: 72 MMHG | WEIGHT: 123 LBS | RESPIRATION RATE: 18 BRPM

## 2025-02-05 DIAGNOSIS — N64.4 BREAST PAIN: Primary | ICD-10-CM

## 2025-02-05 PROCEDURE — 99213 OFFICE O/P EST LOW 20 MIN: CPT | Performed by: NURSE PRACTITIONER

## 2025-02-05 PROCEDURE — G8420 CALC BMI NORM PARAMETERS: HCPCS | Performed by: NURSE PRACTITIONER

## 2025-02-05 PROCEDURE — 1036F TOBACCO NON-USER: CPT | Performed by: NURSE PRACTITIONER

## 2025-02-05 PROCEDURE — G8427 DOCREV CUR MEDS BY ELIG CLIN: HCPCS | Performed by: NURSE PRACTITIONER

## 2025-02-05 NOTE — PROGRESS NOTES
Romina Childers  2025    YOB: 2000          The patient was seen today. She is here regarding breast pain. Pain is mostly in right breast but both are sore.  Tenderness started last week couple days prior to starting her period. Pain has improved some since starting her period.  LMP . Started period last week and currently on it. Not currently on birth control. Took negative pregnancy test at home. Does not desire pregnancy. Denies family hx of breast cancer. Denies lumps or nipple discharge. Her bowels are regular and she is voiding without difficulty.     HPI:  Romina Childers is a 24 y.o. female      Breast pain      OB History    Para Term  AB Living   0 0 0 0 0 0   SAB IAB Ectopic Molar Multiple Live Births   0 0 0 0 0 0       Past Medical History:   Diagnosis Date    Anxiety        No past surgical history on file.    Family History   Problem Relation Age of Onset    Diabetes Father     High Blood Pressure Father     Other Father     Cancer Paternal Uncle     Diabetes Paternal Grandmother        Social History     Socioeconomic History    Marital status: Unknown     Spouse name: Not on file    Number of children: Not on file    Years of education: Not on file    Highest education level: Not on file   Occupational History    Not on file   Tobacco Use    Smoking status: Never    Smokeless tobacco: Never   Vaping Use    Vaping status: Former    Quit date: 2025    Substances: Nicotine    Devices: Disposable   Substance and Sexual Activity    Alcohol use: Yes     Comment: I drink on the a Saturday thats it    Drug use: Never    Sexual activity: Yes     Partners: Male   Other Topics Concern    Not on file   Social History Narrative    Not on file     Social Determinants of Health     Financial Resource Strain: Low Risk  (3/22/2024)    Overall Financial Resource Strain (CARDIA)     Difficulty of Paying Living Expenses: Not hard at all   Food

## 2025-02-06 ENCOUNTER — OFFICE VISIT (OUTPATIENT)
Dept: PRIMARY CARE CLINIC | Age: 25
End: 2025-02-06
Payer: COMMERCIAL

## 2025-02-06 VITALS
HEIGHT: 62 IN | SYSTOLIC BLOOD PRESSURE: 108 MMHG | HEART RATE: 113 BPM | WEIGHT: 119 LBS | TEMPERATURE: 98.1 F | DIASTOLIC BLOOD PRESSURE: 60 MMHG | BODY MASS INDEX: 21.9 KG/M2 | RESPIRATION RATE: 16 BRPM | OXYGEN SATURATION: 98 %

## 2025-02-06 DIAGNOSIS — H65.193 ACUTE MEE (MIDDLE EAR EFFUSION), BILATERAL: Primary | ICD-10-CM

## 2025-02-06 DIAGNOSIS — J01.40 ACUTE NON-RECURRENT PANSINUSITIS: ICD-10-CM

## 2025-02-06 PROCEDURE — G8427 DOCREV CUR MEDS BY ELIG CLIN: HCPCS | Performed by: NURSE PRACTITIONER

## 2025-02-06 PROCEDURE — 99213 OFFICE O/P EST LOW 20 MIN: CPT | Performed by: NURSE PRACTITIONER

## 2025-02-06 PROCEDURE — 1036F TOBACCO NON-USER: CPT | Performed by: NURSE PRACTITIONER

## 2025-02-06 PROCEDURE — G8420 CALC BMI NORM PARAMETERS: HCPCS | Performed by: NURSE PRACTITIONER

## 2025-02-06 ASSESSMENT — ENCOUNTER SYMPTOMS
TROUBLE SWALLOWING: 0
SINUS COMPLAINT: 1
VOICE CHANGE: 0
SINUS PRESSURE: 1
SWOLLEN GLANDS: 0
HOARSE VOICE: 0
SORE THROAT: 0
SINUS PAIN: 1
RESPIRATORY NEGATIVE: 1
COUGH: 0
SHORTNESS OF BREATH: 0

## 2025-02-06 NOTE — PROGRESS NOTES
[facility] PHYSICIANS [facility], [facility] WALK-IN  [address],  [address]  [facility] [address]  Dept: [phone]  Dept Fax: [phone]    Romina Childers is a 24 y.o. female who presents to the urgent care today for her medical conditions/complaints as notedbelow.  Romina Childers is c/o of Headache and concerned about mold       HPI:     24 yr old female presents for sinus pressure, sinus headaches and marina ear congestion.   Started in her chest and went to sinuses  No cough, no wheezing, sob, no fevers      Sinus Problem  This is a new problem. The current episode started 1 to 4 weeks ago. The problem has been gradually worsening since onset. There has been no fever. The pain is mild. Associated symptoms include congestion, ear pain, headaches and sinus pressure. Pertinent negatives include no chills, coughing, diaphoresis, hoarse voice, neck pain, shortness of breath, sneezing, sore throat or swollen glands. Past treatments include nothing. The treatment provided no relief.       Past Medical History:   Diagnosis Date    Anxiety         Current Outpatient Medications   Medication Sig Dispense Refill    amoxicillin-clavulanate (AUGMENTIN) 875-125 MG per tablet Take 1 tablet by mouth 2 times daily for 10 days 20 tablet 0    loratadine (CLARITIN) 10 MG tablet Take 1 tablet by mouth daily 30 tablet 0    fluticasone (FLONASE) 50 MCG/ACT nasal spray 2 sprays by Each Nostril route daily 16 g 0    hydrOXYzine HCl (ATARAX) 25 MG tablet Take 1 tablet by mouth every 8 hours as needed for Anxiety 30 tablet 0    ibuprofen (ADVIL;MOTRIN) 600 MG tablet Take 1 tablet by mouth 3 times daily as needed for Pain 30 tablet 0    Sulfacetamide Sodium-Sulfur (AVAR CLEANSER) 10-5 % LIQD ex cleanser Wash face, chest, and back daily 227 g 5    tretinoin (RETIN-A) 0.025 % cream Apply pea sized amount to face nightly 45 g 3     No current facility-administered medications for this visit.

## 2025-02-07 ENCOUNTER — PATIENT MESSAGE (OUTPATIENT)
Dept: PRIMARY CARE CLINIC | Age: 25
End: 2025-02-07

## 2025-02-07 DIAGNOSIS — Z11.4 SCREENING FOR HIV (HUMAN IMMUNODEFICIENCY VIRUS): Primary | ICD-10-CM

## 2025-02-07 DIAGNOSIS — Z13.6 ENCOUNTER FOR LIPID SCREENING FOR CARDIOVASCULAR DISEASE: ICD-10-CM

## 2025-02-07 DIAGNOSIS — Z11.59 ENCOUNTER FOR HCV SCREENING TEST FOR LOW RISK PATIENT: ICD-10-CM

## 2025-02-07 DIAGNOSIS — Z13.220 ENCOUNTER FOR LIPID SCREENING FOR CARDIOVASCULAR DISEASE: ICD-10-CM

## 2025-02-07 DIAGNOSIS — E55.9 VITAMIN D DEFICIENCY: ICD-10-CM

## 2025-02-17 ENCOUNTER — OFFICE VISIT (OUTPATIENT)
Dept: FAMILY MEDICINE CLINIC | Age: 25
End: 2025-02-17

## 2025-02-17 VITALS
OXYGEN SATURATION: 98 % | HEART RATE: 107 BPM | SYSTOLIC BLOOD PRESSURE: 112 MMHG | WEIGHT: 118 LBS | DIASTOLIC BLOOD PRESSURE: 68 MMHG | RESPIRATION RATE: 14 BRPM | BODY MASS INDEX: 21.58 KG/M2 | TEMPERATURE: 98.1 F

## 2025-02-17 DIAGNOSIS — H61.21 IMPACTED CERUMEN OF RIGHT EAR: Primary | ICD-10-CM

## 2025-02-17 DIAGNOSIS — H93.8X3 EAR FULLNESS, BILATERAL: ICD-10-CM

## 2025-02-17 NOTE — PROGRESS NOTES
[facility] PHYSICIANS [facility], [facility] WALK-IN  [address]  [address]  [facility] [address]  Dept: [phone]     Romina Childers is a 24 y.o. female Established patient, who presents to the walk-in clinic today with conditions/complaints as noted below:    Chief Complaint   Patient presents with    Ear Pain     Pt seen multiple times for upper respiratory sx. Recently seen on 2/6/25. Completed course of Augmentin.   Continues with bilateral ear pain and headache.         HPI:     HPI  Pt presented to the clinic today with c/o bilateral ear pain. This is a new problem. The current episode started several weeks ago. Associated symptoms include: HA .  Pertinent negatives include: No fever, aches, chills, cough, SOB, CP,  sore throat.  Pt has tried Augmentin with some improvement.      Was seen 2/6 - dx with sinusitis and bilat KAMILAH.     Past Medical History:   Diagnosis Date    Anxiety        Current Outpatient Medications   Medication Sig Dispense Refill    loratadine (CLARITIN) 10 MG tablet Take 1 tablet by mouth daily 30 tablet 0    fluticasone (FLONASE) 50 MCG/ACT nasal spray 2 sprays by Each Nostril route daily 16 g 0    hydrOXYzine HCl (ATARAX) 25 MG tablet Take 1 tablet by mouth every 8 hours as needed for Anxiety 30 tablet 0    Sulfacetamide Sodium-Sulfur (AVAR CLEANSER) 10-5 % LIQD ex cleanser Wash face, chest, and back daily 227 g 5    tretinoin (RETIN-A) 0.025 % cream Apply pea sized amount to face nightly 45 g 3    ibuprofen (ADVIL;MOTRIN) 600 MG tablet Take 1 tablet by mouth 3 times daily as needed for Pain 30 tablet 0     No current facility-administered medications for this visit.       Allergies   Allergen Reactions    Latex        Review of Systems:     Review of Systems See HPI    Physical Exam:      /68   Pulse (!) 107   Temp 98.1 °F (36.7 °C)   Resp 14   Wt 53.5 kg (118 lb)   LMP 01/30/2025   SpO2 98%   BMI 21.58 kg/m²     Physical

## 2025-02-28 ENCOUNTER — HOSPITAL ENCOUNTER (OUTPATIENT)
Age: 25
Discharge: HOME | End: 2025-02-28
Payer: COMMERCIAL

## 2025-02-28 DIAGNOSIS — Z77.120 MOLD EXPOSURE: ICD-10-CM

## 2025-02-28 LAB
A ALTERNATA IGE QN: NORMAL KU/L (ref 0–0.34)
A FUMIGATUS IGE QN: NORMAL KU/L (ref 0–0.34)
ALLERGEN ASPERGILLUS NIGER IGE: NORMAL KU/L (ref 0–0.34)
ALLERGEN CEPHALOSPORIUM ACREMONIUM IGE: NORMAL KU/L (ref 0–0.34)
ALLERGEN TRICHOPHYTON RUBRUM IGE: NORMAL KU/L (ref 0–0.34)
C ALBICANS IGE QN: NORMAL KU/L (ref 0–0.34)
C HERBARUM IGE QN: NORMAL KUL/L (ref 0–0.34)
C LUNATA IGE QN: NORMAL KU/L (ref 0–0.34)
E PURPURASCENS IGE QN: NORMAL KU/L (ref 0–0.34)
F MONILIFORME IGE QN: NORMAL KU/L (ref 0–0.34)
IGE SERPL-ACNC: 15 IU/ML (ref 0–100)
M RACEMOSUS IGE QN: NORMAL KU/L (ref 0–0.34)
P BETAE IGE QN: NORMAL KU/L (ref 0–0.34)
P NOTATUM IGE QN: NORMAL KU/L (ref 0–0.34)
R NIGRICANS IGE QN: NORMAL KU/L (ref 0–0.34)
S BOTRYOSUM IGE QN: NORMAL KU/L (ref 0–0.34)
S ROSTRATA IGE QN: NORMAL KU/L (ref 0–0.34)

## 2025-02-28 PROCEDURE — 82785 ASSAY OF IGE: CPT

## 2025-02-28 PROCEDURE — 36415 COLL VENOUS BLD VENIPUNCTURE: CPT

## 2025-02-28 PROCEDURE — 86003 ALLG SPEC IGE CRUDE XTRC EA: CPT

## 2025-03-03 LAB
A ALTERNATA IGE QN: <0.1 KU/L (ref 0–0.34)
A FUMIGATUS IGE QN: <0.1 KU/L (ref 0–0.34)
ALLERGEN ASPERGILLUS NIGER IGE: <0.1 KU/L (ref 0–0.34)
ALLERGEN CEPHALOSPORIUM ACREMONIUM IGE: <0.1 KU/L (ref 0–0.34)
ALLERGEN TRICHOPHYTON RUBRUM IGE: <0.1 KU/L (ref 0–0.34)
C ALBICANS IGE QN: <0.1 KU/L (ref 0–0.34)
C HERBARUM IGE QN: <0.1 KUL/L (ref 0–0.34)
C LUNATA IGE QN: <0.1 KU/L (ref 0–0.34)
E PURPURASCENS IGE QN: <0.1 KU/L (ref 0–0.34)
F MONILIFORME IGE QN: <0.1 KU/L (ref 0–0.34)
IGE SERPL-ACNC: 15 IU/ML (ref 0–100)
M RACEMOSUS IGE QN: <0.1 KU/L (ref 0–0.34)
P BETAE IGE QN: <0.1 KU/L (ref 0–0.34)
P NOTATUM IGE QN: <0.1 KU/L (ref 0–0.34)
R NIGRICANS IGE QN: <0.1 KU/L (ref 0–0.34)
S BOTRYOSUM IGE QN: <0.1 KU/L (ref 0–0.34)
S ROSTRATA IGE QN: <0.1 KU/L (ref 0–0.34)

## 2025-03-07 ENCOUNTER — OFFICE VISIT (OUTPATIENT)
Dept: PRIMARY CARE CLINIC | Age: 25
End: 2025-03-07
Payer: COMMERCIAL

## 2025-03-07 VITALS
HEIGHT: 62 IN | SYSTOLIC BLOOD PRESSURE: 110 MMHG | BODY MASS INDEX: 22.38 KG/M2 | HEART RATE: 103 BPM | WEIGHT: 121.6 LBS | OXYGEN SATURATION: 98 % | DIASTOLIC BLOOD PRESSURE: 62 MMHG

## 2025-03-07 DIAGNOSIS — R29.898 NECK TIGHTNESS: ICD-10-CM

## 2025-03-07 DIAGNOSIS — H92.01 RIGHT EAR PAIN: ICD-10-CM

## 2025-03-07 DIAGNOSIS — R59.9 REACTIVE LYMPHADENOPATHY: ICD-10-CM

## 2025-03-07 DIAGNOSIS — F41.8 MIXED ANXIETY AND DEPRESSIVE DISORDER: Primary | ICD-10-CM

## 2025-03-07 DIAGNOSIS — G44.219 EPISODIC TENSION-TYPE HEADACHE, NOT INTRACTABLE: ICD-10-CM

## 2025-03-07 PROCEDURE — 1036F TOBACCO NON-USER: CPT | Performed by: PHYSICIAN ASSISTANT

## 2025-03-07 PROCEDURE — 99214 OFFICE O/P EST MOD 30 MIN: CPT | Performed by: PHYSICIAN ASSISTANT

## 2025-03-07 PROCEDURE — G8427 DOCREV CUR MEDS BY ELIG CLIN: HCPCS | Performed by: PHYSICIAN ASSISTANT

## 2025-03-07 PROCEDURE — G8420 CALC BMI NORM PARAMETERS: HCPCS | Performed by: PHYSICIAN ASSISTANT

## 2025-03-07 RX ORDER — TIZANIDINE 2 MG/1
2 TABLET ORAL 2 TIMES DAILY PRN
Qty: 30 TABLET | Refills: 0 | Status: SHIPPED | OUTPATIENT
Start: 2025-03-07

## 2025-03-07 RX ORDER — PROPRANOLOL HYDROCHLORIDE 60 MG/1
60 CAPSULE, EXTENDED RELEASE ORAL DAILY
Qty: 30 CAPSULE | Refills: 0 | Status: SHIPPED | OUTPATIENT
Start: 2025-03-07

## 2025-03-07 ASSESSMENT — PATIENT HEALTH QUESTIONNAIRE - PHQ9
9. THOUGHTS THAT YOU WOULD BE BETTER OFF DEAD, OR OF HURTING YOURSELF: NOT AT ALL
4. FEELING TIRED OR HAVING LITTLE ENERGY: NEARLY EVERY DAY
3. TROUBLE FALLING OR STAYING ASLEEP: NEARLY EVERY DAY
2. FEELING DOWN, DEPRESSED OR HOPELESS: NOT AT ALL
7. TROUBLE CONCENTRATING ON THINGS, SUCH AS READING THE NEWSPAPER OR WATCHING TELEVISION: NOT AT ALL
1. LITTLE INTEREST OR PLEASURE IN DOING THINGS: SEVERAL DAYS
8. MOVING OR SPEAKING SO SLOWLY THAT OTHER PEOPLE COULD HAVE NOTICED. OR THE OPPOSITE, BEING SO FIGETY OR RESTLESS THAT YOU HAVE BEEN MOVING AROUND A LOT MORE THAN USUAL: SEVERAL DAYS
10. IF YOU CHECKED OFF ANY PROBLEMS, HOW DIFFICULT HAVE THESE PROBLEMS MADE IT FOR YOU TO DO YOUR WORK, TAKE CARE OF THINGS AT HOME, OR GET ALONG WITH OTHER PEOPLE: VERY DIFFICULT
SUM OF ALL RESPONSES TO PHQ QUESTIONS 1-9: 8
6. FEELING BAD ABOUT YOURSELF - OR THAT YOU ARE A FAILURE OR HAVE LET YOURSELF OR YOUR FAMILY DOWN: NOT AT ALL
SUM OF ALL RESPONSES TO PHQ QUESTIONS 1-9: 8
5. POOR APPETITE OR OVEREATING: NOT AT ALL

## 2025-03-07 ASSESSMENT — ANXIETY QUESTIONNAIRES
4. TROUBLE RELAXING: MORE THAN HALF THE DAYS
2. NOT BEING ABLE TO STOP OR CONTROL WORRYING: NEARLY EVERY DAY
5. BEING SO RESTLESS THAT IT IS HARD TO SIT STILL: NOT AT ALL
7. FEELING AFRAID AS IF SOMETHING AWFUL MIGHT HAPPEN: NOT AT ALL
3. WORRYING TOO MUCH ABOUT DIFFERENT THINGS: NEARLY EVERY DAY
6. BECOMING EASILY ANNOYED OR IRRITABLE: NEARLY EVERY DAY
1. FEELING NERVOUS, ANXIOUS, OR ON EDGE: NEARLY EVERY DAY
GAD7 TOTAL SCORE: 14
IF YOU CHECKED OFF ANY PROBLEMS ON THIS QUESTIONNAIRE, HOW DIFFICULT HAVE THESE PROBLEMS MADE IT FOR YOU TO DO YOUR WORK, TAKE CARE OF THINGS AT HOME, OR GET ALONG WITH OTHER PEOPLE: VERY DIFFICULT

## 2025-03-07 ASSESSMENT — ENCOUNTER SYMPTOMS
CONSTIPATION: 0
COUGH: 0
ABDOMINAL PAIN: 0
SHORTNESS OF BREATH: 0
SORE THROAT: 0
NAUSEA: 0
SINUS PAIN: 0
DIARRHEA: 0
VOMITING: 0
SINUS PRESSURE: 0
FACIAL SWELLING: 0
EYE PAIN: 0

## 2025-03-07 NOTE — PROGRESS NOTES
[facility]  [facility] PRIMARY CARE  [address] [address]  Dept: [phone]  Dept Fax: [phone]    Romina Childers is a 24 y.o. female who presents today for her medical conditions/complaints as noted below.    Chief Complaint   Patient presents with    Referral - General     Patient would like a referral for psychiatry. Patient states that she has been very anxious and very bad since she lost her dad, she states she get to the point where her heart starts to race very fast.    Other     Lump on back of RT side neck, been having pain  in ears, head and neck.    Student-OK       HPI:     Patient presents to the clinic for a follow-up visit on her anxiety.    She has a history of mixed anxiety and depression. Reports her anxiety is what is bothering her the most lately. In the past, she has tried Zoloft, Abilify, BuSpar, and Atarax for her anxiety with no relief. She would like to restart seeing a psychiatrist again. She is also inquiring about Genesight testing. She has never seen counseling before, but she is open to seeing someone about it. Denies depression, suicidal or homicidal ideation, or panic attacks.    She also reports a lump on her neck, frequent headaches, and ear/neck pain. On 2/6 she was diagnosed with acute KAMILAH and a sinus infection, she was treated appropriately with a course of Augmentin. On 2/17 she presented to the walk in clinic with cerumen impaction, her ears were washed out and she was sent home on supportive care. About a week ago, she noticed a small bump on her R neck, this is nontender, nonerythematous, and it has not changed in size. She also reports persistent ear pain and ear fullness sensation. Denied hearing loss, fever, chills, sore throat, congestion, or cough. She also reports frequent headaches and neck stiffness. These occur every other day and are treated well with Tylenol. She denies nausea, vomiting, auras, blurry or double vision,

## 2025-03-13 ENCOUNTER — TELEPHONE (OUTPATIENT)
Dept: PRIMARY CARE CLINIC | Age: 25
End: 2025-03-13

## 2025-03-13 ENCOUNTER — OFFICE VISIT (OUTPATIENT)
Dept: FAMILY MEDICINE CLINIC | Age: 25
End: 2025-03-13
Payer: COMMERCIAL

## 2025-03-13 VITALS
BODY MASS INDEX: 23.56 KG/M2 | HEIGHT: 60 IN | OXYGEN SATURATION: 98 % | HEART RATE: 76 BPM | WEIGHT: 120 LBS | SYSTOLIC BLOOD PRESSURE: 116 MMHG | DIASTOLIC BLOOD PRESSURE: 84 MMHG

## 2025-03-13 DIAGNOSIS — F41.8 MIXED ANXIETY AND DEPRESSIVE DISORDER: ICD-10-CM

## 2025-03-13 DIAGNOSIS — K21.9 GASTROESOPHAGEAL REFLUX DISEASE WITHOUT ESOPHAGITIS: Primary | ICD-10-CM

## 2025-03-13 PROCEDURE — G8427 DOCREV CUR MEDS BY ELIG CLIN: HCPCS | Performed by: PHYSICIAN ASSISTANT

## 2025-03-13 PROCEDURE — G8420 CALC BMI NORM PARAMETERS: HCPCS | Performed by: PHYSICIAN ASSISTANT

## 2025-03-13 PROCEDURE — 99213 OFFICE O/P EST LOW 20 MIN: CPT | Performed by: PHYSICIAN ASSISTANT

## 2025-03-13 PROCEDURE — 1036F TOBACCO NON-USER: CPT | Performed by: PHYSICIAN ASSISTANT

## 2025-03-13 RX ORDER — PANTOPRAZOLE SODIUM 20 MG/1
20 TABLET, DELAYED RELEASE ORAL
Qty: 30 TABLET | Refills: 0 | Status: SHIPPED | OUTPATIENT
Start: 2025-03-13

## 2025-03-13 RX ORDER — FAMOTIDINE 20 MG/1
20 TABLET, FILM COATED ORAL DAILY PRN
Qty: 30 TABLET | Refills: 3 | Status: SHIPPED | OUTPATIENT
Start: 2025-03-13

## 2025-03-13 ASSESSMENT — ENCOUNTER SYMPTOMS
BELCHING: 1
RECTAL PAIN: 0
NAUSEA: 0
VOMITING: 0
HEARTBURN: 1
GLOBUS SENSATION: 1
COUGH: 1

## 2025-03-13 NOTE — TELEPHONE ENCOUNTER
A call was made to the patient to inform her that, due to her history of no-shows, Sushil Rosen PA-C, is unable to see her today. If she requires immediate care for her symptoms, she is advised to visit the walk-in clinic at the office.    A voice message was left for the patient, notifying her that her appointment has been canceled and advising her to be seen by the walk-in clinic, as recommended by Sushil Rosen PA-C.

## 2025-03-13 NOTE — PROGRESS NOTES
[facility] Walk-In  [address]  [address]  [facility] [address]  Phone: [phone]  Fax: [phone]       [facility] WALK - IN    Pt Name: Romina Childers  MRN: [id number]  Birthdate 2000  Date of evaluation: 3/13/2025  Provider: Idalia Hannah PA-C     CHIEF COMPLAINT       Chief Complaint   Patient presents with    Chest Pain     X 2 weeks, chest tightness/burn only happens when she eats certain foods.           HISTORY OF PRESENT ILLNESS  (Location/Symptom, Timing/Onset, Context/Setting, Quality, Duration, Modifying Factors, Severity.)   Romina Childers is a 24 y.o. Declined [7]  White (non-) [1] female who presents to the office for evaluation of      Gastroesophageal Reflux  She complains of belching, coughing, globus sensation and heartburn. She reports no nausea. This is a new problem. The current episode started 1 to 4 weeks ago. The problem occurs frequently. The heartburn duration is several minutes. The heartburn is located in the substernum. The heartburn is of moderate intensity. The heartburn does not wake her from sleep. The symptoms are aggravated by certain foods, caffeine and lying down. Pertinent negatives include no fatigue. Risk factors include caffeine use, smoking/tobacco exposure and ETOH use. She has tried nothing for the symptoms.       Nursing Notes were reviewed.    REVIEW OF SYSTEMS    (2-9 systems for level 4, 10 or more for level 5)     Review of Systems   Constitutional:  Negative for chills, diaphoresis, fatigue and fever.   HENT: Negative.     Respiratory:  Positive for cough.    Gastrointestinal:  Positive for heartburn. Negative for nausea, rectal pain and vomiting.         Except as noted above the remainder of the review of systems was reviewed andnegative.       PAST MEDICAL HISTORY   History reviewed.    Past Medical History:   Diagnosis Date    Anxiety          SURGICAL HISTORY     History reviewed.  No past surgical history on

## 2025-04-25 ENCOUNTER — OFFICE VISIT (OUTPATIENT)
Dept: PRIMARY CARE CLINIC | Age: 25
End: 2025-04-25
Payer: COMMERCIAL

## 2025-04-25 VITALS
DIASTOLIC BLOOD PRESSURE: 74 MMHG | BODY MASS INDEX: 23.64 KG/M2 | HEART RATE: 110 BPM | WEIGHT: 120.4 LBS | SYSTOLIC BLOOD PRESSURE: 116 MMHG | OXYGEN SATURATION: 98 % | HEIGHT: 60 IN

## 2025-04-25 DIAGNOSIS — G47.00 INSOMNIA, UNSPECIFIED TYPE: ICD-10-CM

## 2025-04-25 DIAGNOSIS — G44.219 EPISODIC TENSION-TYPE HEADACHE, NOT INTRACTABLE: Primary | ICD-10-CM

## 2025-04-25 DIAGNOSIS — F41.8 MIXED ANXIETY AND DEPRESSIVE DISORDER: ICD-10-CM

## 2025-04-25 PROCEDURE — 99214 OFFICE O/P EST MOD 30 MIN: CPT | Performed by: PHYSICIAN ASSISTANT

## 2025-04-25 PROCEDURE — G8427 DOCREV CUR MEDS BY ELIG CLIN: HCPCS | Performed by: PHYSICIAN ASSISTANT

## 2025-04-25 PROCEDURE — G8420 CALC BMI NORM PARAMETERS: HCPCS | Performed by: PHYSICIAN ASSISTANT

## 2025-04-25 PROCEDURE — 1036F TOBACCO NON-USER: CPT | Performed by: PHYSICIAN ASSISTANT

## 2025-04-25 RX ORDER — TRAZODONE HYDROCHLORIDE 50 MG/1
50 TABLET ORAL NIGHTLY PRN
Qty: 30 TABLET | Refills: 0 | Status: SHIPPED | OUTPATIENT
Start: 2025-04-25

## 2025-04-25 RX ORDER — PROPRANOLOL HYDROCHLORIDE 60 MG/1
60 CAPSULE, EXTENDED RELEASE ORAL DAILY
Qty: 30 CAPSULE | Refills: 2 | Status: SHIPPED | OUTPATIENT
Start: 2025-04-25

## 2025-04-25 ASSESSMENT — PATIENT HEALTH QUESTIONNAIRE - PHQ9
5. POOR APPETITE OR OVEREATING: SEVERAL DAYS
4. FEELING TIRED OR HAVING LITTLE ENERGY: NEARLY EVERY DAY
SUM OF ALL RESPONSES TO PHQ QUESTIONS 1-9: 10
2. FEELING DOWN, DEPRESSED OR HOPELESS: NOT AT ALL
1. LITTLE INTEREST OR PLEASURE IN DOING THINGS: NEARLY EVERY DAY
SUM OF ALL RESPONSES TO PHQ QUESTIONS 1-9: 10
SUM OF ALL RESPONSES TO PHQ QUESTIONS 1-9: 10
3. TROUBLE FALLING OR STAYING ASLEEP: NEARLY EVERY DAY
8. MOVING OR SPEAKING SO SLOWLY THAT OTHER PEOPLE COULD HAVE NOTICED. OR THE OPPOSITE, BEING SO FIGETY OR RESTLESS THAT YOU HAVE BEEN MOVING AROUND A LOT MORE THAN USUAL: NOT AT ALL
SUM OF ALL RESPONSES TO PHQ QUESTIONS 1-9: 10
9. THOUGHTS THAT YOU WOULD BE BETTER OFF DEAD, OR OF HURTING YOURSELF: NOT AT ALL
6. FEELING BAD ABOUT YOURSELF - OR THAT YOU ARE A FAILURE OR HAVE LET YOURSELF OR YOUR FAMILY DOWN: NOT AT ALL
10. IF YOU CHECKED OFF ANY PROBLEMS, HOW DIFFICULT HAVE THESE PROBLEMS MADE IT FOR YOU TO DO YOUR WORK, TAKE CARE OF THINGS AT HOME, OR GET ALONG WITH OTHER PEOPLE: SOMEWHAT DIFFICULT

## 2025-04-25 ASSESSMENT — ENCOUNTER SYMPTOMS
DIARRHEA: 0
NAUSEA: 0
COUGH: 0
ABDOMINAL PAIN: 0
RHINORRHEA: 0
BACK PAIN: 0
SHORTNESS OF BREATH: 0
VOMITING: 0
CONSTIPATION: 0
SINUS PAIN: 0

## 2025-04-25 ASSESSMENT — ANXIETY QUESTIONNAIRES
3. WORRYING TOO MUCH ABOUT DIFFERENT THINGS: MORE THAN HALF THE DAYS
4. TROUBLE RELAXING: SEVERAL DAYS
GAD7 TOTAL SCORE: 10
5. BEING SO RESTLESS THAT IT IS HARD TO SIT STILL: NOT AT ALL
7. FEELING AFRAID AS IF SOMETHING AWFUL MIGHT HAPPEN: NOT AT ALL
1. FEELING NERVOUS, ANXIOUS, OR ON EDGE: MORE THAN HALF THE DAYS
6. BECOMING EASILY ANNOYED OR IRRITABLE: NEARLY EVERY DAY
2. NOT BEING ABLE TO STOP OR CONTROL WORRYING: MORE THAN HALF THE DAYS
IF YOU CHECKED OFF ANY PROBLEMS ON THIS QUESTIONNAIRE, HOW DIFFICULT HAVE THESE PROBLEMS MADE IT FOR YOU TO DO YOUR WORK, TAKE CARE OF THINGS AT HOME, OR GET ALONG WITH OTHER PEOPLE: SOMEWHAT DIFFICULT

## 2025-04-25 NOTE — PROGRESS NOTES
[facility]  [facility] PRIMARY CARE  [address] [place] [address]  Dept: [phone]  Dept Fax: [phone]    Romina Childers is a 24 y.o. female who presents today for her medical conditions/complaints as noted below.    Chief Complaint   Patient presents with    Headache     Patient in office for headache f/u. Patient states that they have been better and thinks that it is her anxiety that is causing them.       HPI:     Patient presents the office for follow-up.  At last office visit she was started on propranolol for episodic headache/tension headache.  This medication did provide good relief.  Her frequency of headaches improved.  Severity is better as well.  She is using ibuprofen on a sparing basis.  Overall feels better.    She continues to struggle with anxiety, stress, down mood.  This is a chronic issue for the last 6 years.  She has been on many medications in the past.  She has been advised to follow with psychiatry, but has not done so thus far.  No recent counseling.  She has been experiencing multiple family tragedies and deaths over the last year which is contributed to exacerbation of her mental health.  No thoughts of harming yourself or others.  Again stressed the importance of following with psychiatry.  She does have issues with poor sleep.  She is may be sleeping 3 to 4 hours per night.  This is broken up.  Does not feel rested.  There is racing thoughts.    BP stable.          No results found for: \"LABA1C\"          ( goal A1C is < 7)   No components found for: \"LABMICR\"  No components found for: \"LDLCHOLESTEROL\", \"LDLCALC\"    (goal LDL is <100)   AST (U/L)   Date Value   02/20/2021 29     ALT (U/L)   Date Value   02/20/2021 15     BUN (mg/dL)   Date Value   09/01/2021 14     BP Readings from Last 3 Encounters:   04/25/25 116/74   03/13/25 116/84   03/07/25 110/62          (goal 120/80)    Past Medical History:   Diagnosis Date    Anxiety       History